# Patient Record
Sex: MALE | Race: WHITE | Employment: FULL TIME | URBAN - METROPOLITAN AREA
[De-identification: names, ages, dates, MRNs, and addresses within clinical notes are randomized per-mention and may not be internally consistent; named-entity substitution may affect disease eponyms.]

---

## 2017-02-02 ENCOUNTER — OFFICE VISIT (OUTPATIENT)
Dept: ORTHOPEDIC SURGERY | Age: 52
End: 2017-02-02

## 2017-02-02 VITALS
SYSTOLIC BLOOD PRESSURE: 137 MMHG | HEIGHT: 75 IN | DIASTOLIC BLOOD PRESSURE: 92 MMHG | HEART RATE: 72 BPM | BODY MASS INDEX: 31.82 KG/M2 | WEIGHT: 255.95 LBS

## 2017-02-02 DIAGNOSIS — M25.561 ACUTE PAIN OF RIGHT KNEE: Primary | ICD-10-CM

## 2017-02-02 PROCEDURE — 73562 X-RAY EXAM OF KNEE 3: CPT | Performed by: ORTHOPAEDIC SURGERY

## 2017-02-02 PROCEDURE — 20610 DRAIN/INJ JOINT/BURSA W/O US: CPT | Performed by: ORTHOPAEDIC SURGERY

## 2017-02-02 PROCEDURE — 99213 OFFICE O/P EST LOW 20 MIN: CPT | Performed by: ORTHOPAEDIC SURGERY

## 2017-02-27 RX ORDER — MELOXICAM 15 MG/1
TABLET ORAL
Qty: 90 TABLET | Refills: 0 | Status: SHIPPED | OUTPATIENT
Start: 2017-02-27 | End: 2017-05-17 | Stop reason: SDUPTHER

## 2017-05-17 ENCOUNTER — OFFICE VISIT (OUTPATIENT)
Dept: FAMILY MEDICINE CLINIC | Age: 52
End: 2017-05-17

## 2017-05-17 VITALS
HEART RATE: 70 BPM | BODY MASS INDEX: 30.71 KG/M2 | DIASTOLIC BLOOD PRESSURE: 82 MMHG | OXYGEN SATURATION: 98 % | SYSTOLIC BLOOD PRESSURE: 120 MMHG | TEMPERATURE: 98.8 F | WEIGHT: 247 LBS

## 2017-05-17 DIAGNOSIS — K21.9 GASTROESOPHAGEAL REFLUX DISEASE WITHOUT ESOPHAGITIS: ICD-10-CM

## 2017-05-17 DIAGNOSIS — M75.41 SHOULDER IMPINGEMENT, RIGHT: ICD-10-CM

## 2017-05-17 DIAGNOSIS — M19.90 ARTHRITIS: Primary | ICD-10-CM

## 2017-05-17 PROCEDURE — 99213 OFFICE O/P EST LOW 20 MIN: CPT | Performed by: NURSE PRACTITIONER

## 2017-05-17 RX ORDER — HYDROCODONE BITARTRATE AND ACETAMINOPHEN 5; 325 MG/1; MG/1
1-2 TABLET ORAL EVERY 6 HOURS PRN
Qty: 120 TABLET | Refills: 0 | Status: SHIPPED | OUTPATIENT
Start: 2017-05-17 | End: 2017-12-22 | Stop reason: SDUPTHER

## 2017-05-17 RX ORDER — MELOXICAM 15 MG/1
TABLET ORAL
Qty: 90 TABLET | Refills: 0 | Status: SHIPPED | OUTPATIENT
Start: 2017-05-17 | End: 2017-09-07 | Stop reason: SDUPTHER

## 2017-05-17 RX ORDER — OMEPRAZOLE 20 MG/1
CAPSULE, DELAYED RELEASE ORAL
Qty: 180 CAPSULE | Refills: 3 | Status: SHIPPED | OUTPATIENT
Start: 2017-05-17 | End: 2017-12-22 | Stop reason: SDUPTHER

## 2017-06-21 ENCOUNTER — OFFICE VISIT (OUTPATIENT)
Dept: FAMILY MEDICINE CLINIC | Age: 52
End: 2017-06-21

## 2017-06-21 VITALS
OXYGEN SATURATION: 98 % | WEIGHT: 249 LBS | HEART RATE: 73 BPM | HEIGHT: 75 IN | DIASTOLIC BLOOD PRESSURE: 86 MMHG | BODY MASS INDEX: 30.96 KG/M2 | TEMPERATURE: 98.8 F | SYSTOLIC BLOOD PRESSURE: 132 MMHG

## 2017-06-21 DIAGNOSIS — Z01.818 PREOP EXAMINATION: Primary | ICD-10-CM

## 2017-06-21 PROCEDURE — 99242 OFF/OP CONSLTJ NEW/EST SF 20: CPT | Performed by: NURSE PRACTITIONER

## 2017-06-26 ENCOUNTER — TELEPHONE (OUTPATIENT)
Dept: FAMILY MEDICINE CLINIC | Age: 52
End: 2017-06-26

## 2017-09-07 DIAGNOSIS — M19.90 ARTHRITIS: ICD-10-CM

## 2017-09-08 RX ORDER — MELOXICAM 15 MG/1
TABLET ORAL
Qty: 90 TABLET | Refills: 0 | Status: SHIPPED | OUTPATIENT
Start: 2017-09-08 | End: 2017-11-27 | Stop reason: SDUPTHER

## 2017-11-27 DIAGNOSIS — M19.90 ARTHRITIS: ICD-10-CM

## 2017-11-28 RX ORDER — MELOXICAM 15 MG/1
TABLET ORAL
Qty: 90 TABLET | Refills: 0 | Status: SHIPPED | OUTPATIENT
Start: 2017-11-28 | End: 2017-12-22 | Stop reason: SDUPTHER

## 2017-12-22 ENCOUNTER — OFFICE VISIT (OUTPATIENT)
Dept: FAMILY MEDICINE CLINIC | Age: 52
End: 2017-12-22

## 2017-12-22 VITALS
OXYGEN SATURATION: 98 % | HEART RATE: 73 BPM | BODY MASS INDEX: 32.05 KG/M2 | SYSTOLIC BLOOD PRESSURE: 130 MMHG | WEIGHT: 256.4 LBS | TEMPERATURE: 98.3 F | DIASTOLIC BLOOD PRESSURE: 100 MMHG

## 2017-12-22 DIAGNOSIS — M54.50 CHRONIC RIGHT-SIDED LOW BACK PAIN WITHOUT SCIATICA: Primary | ICD-10-CM

## 2017-12-22 DIAGNOSIS — M19.90 ARTHRITIS: ICD-10-CM

## 2017-12-22 DIAGNOSIS — I10 ESSENTIAL HYPERTENSION: ICD-10-CM

## 2017-12-22 DIAGNOSIS — G89.29 CHRONIC RIGHT-SIDED LOW BACK PAIN WITHOUT SCIATICA: Primary | ICD-10-CM

## 2017-12-22 DIAGNOSIS — K21.9 GASTROESOPHAGEAL REFLUX DISEASE WITHOUT ESOPHAGITIS: ICD-10-CM

## 2017-12-22 PROCEDURE — 1036F TOBACCO NON-USER: CPT | Performed by: NURSE PRACTITIONER

## 2017-12-22 PROCEDURE — 3017F COLORECTAL CA SCREEN DOC REV: CPT | Performed by: NURSE PRACTITIONER

## 2017-12-22 PROCEDURE — G8484 FLU IMMUNIZE NO ADMIN: HCPCS | Performed by: NURSE PRACTITIONER

## 2017-12-22 PROCEDURE — G8417 CALC BMI ABV UP PARAM F/U: HCPCS | Performed by: NURSE PRACTITIONER

## 2017-12-22 PROCEDURE — G8427 DOCREV CUR MEDS BY ELIG CLIN: HCPCS | Performed by: NURSE PRACTITIONER

## 2017-12-22 PROCEDURE — 99214 OFFICE O/P EST MOD 30 MIN: CPT | Performed by: NURSE PRACTITIONER

## 2017-12-22 RX ORDER — OMEPRAZOLE 20 MG/1
CAPSULE, DELAYED RELEASE ORAL
Qty: 180 CAPSULE | Refills: 3 | Status: SHIPPED | OUTPATIENT
Start: 2017-12-22 | End: 2018-12-29 | Stop reason: SDUPTHER

## 2017-12-22 RX ORDER — MELOXICAM 15 MG/1
TABLET ORAL
Qty: 90 TABLET | Refills: 1 | Status: SHIPPED | OUTPATIENT
Start: 2017-12-22 | End: 2018-08-05 | Stop reason: SDUPTHER

## 2017-12-22 RX ORDER — LISINOPRIL 10 MG/1
10 TABLET ORAL DAILY
Qty: 30 TABLET | Refills: 3 | Status: SHIPPED | OUTPATIENT
Start: 2017-12-22 | End: 2018-01-19 | Stop reason: SDUPTHER

## 2017-12-22 RX ORDER — HYDROCODONE BITARTRATE AND ACETAMINOPHEN 5; 325 MG/1; MG/1
1-2 TABLET ORAL EVERY 6 HOURS PRN
Qty: 120 TABLET | Refills: 0 | Status: SHIPPED | OUTPATIENT
Start: 2017-12-22 | End: 2019-01-18 | Stop reason: SDUPTHER

## 2017-12-22 RX ORDER — PREDNISONE 10 MG/1
TABLET ORAL
Qty: 18 TABLET | Refills: 0 | Status: SHIPPED | OUTPATIENT
Start: 2017-12-22 | End: 2018-01-19 | Stop reason: ALTCHOICE

## 2017-12-22 NOTE — PROGRESS NOTES
Patient: Flor Barron is a 46 y.o. male who presents today with the following Chief Complaint(s):  Chief Complaint   Patient presents with    Medication Check         HPI   Patient is a 45 yo male who is here for med check for chronic back pain and arthritis. He states his back is starting to hurt again- feels like a pinched nerve. Takes norco sparingly. Takes mobic for arthritis. Has taken muscle relaxers in the past for his back but makes him too groggy. Patient states the pain is the worst with standing. Has pain after about 10 minutes of standing. BP elevated today- denies chest pain but states he has had some chest pressure recently. No headaches or SOB. Current Outpatient Prescriptions   Medication Sig Dispense Refill    omeprazole (PRILOSEC) 20 MG delayed release capsule TAKE ONE CAPSULE BY MOUTH TWICE A  capsule 3    HYDROcodone-acetaminophen (NORCO) 5-325 MG per tablet Take 1-2 tablets by mouth every 6 hours as needed for Pain . Earliest Fill Date: 12/22/17 120 tablet 0    meloxicam (MOBIC) 15 MG tablet TAKE 1 TABLET BY MOUTH  DAILY 90 tablet 1    predniSONE (DELTASONE) 10 MG tablet Take 3 tabs for days 1-3, take 2 tabs day 4-6, take 1 tab days 7-9. 18 tablet 0    lisinopril (PRINIVIL;ZESTRIL) 10 MG tablet Take 1 tablet by mouth daily 30 tablet 3    ascorbic acid (VITAMIN C) 500 MG tablet Take 500 mg by mouth daily.  Potassium Gluconate 550 MG TABS Take 550 mg by mouth.  Ginkgo Biloba 40 MG TABS Take 120 mg by mouth.  ondansetron (ZOFRAN ODT) 4 MG disintegrating tablet Take 1-2 tablets by mouth every 12 hours as needed for Nausea 12 tablet 0    methocarbamol (ROBAXIN) 500 MG tablet Take 1 tablet by mouth 4 times daily 10 tablet 0    naproxen (NAPROSYN) 500 MG tablet Take 1 tablet by mouth 2 times daily 30 tablet 0     No current facility-administered medications for this visit.         Patient's past medical history, surgical history, family history, medications,  and

## 2017-12-22 NOTE — LETTER
[address] [address]  Phone: [phone]  Fax: [phone]    Jeanette Rosario, APRN        December 22, 2017     Patient: Puja Calero   YOB: 1965   Date of Visit: 12/22/2017       To Whom It May Concern: It is my medical opinion that JAYDE Hammonds requires a disability parking placard for the following reasons:  He cannot walk 200 feet without stopping to rest.  Duration of need: 2 years    If you have any questions or concerns, please don't hesitate to call.     Sincerely,        Jeanette Rosario, APRN

## 2018-01-19 ENCOUNTER — OFFICE VISIT (OUTPATIENT)
Dept: FAMILY MEDICINE CLINIC | Age: 53
End: 2018-01-19

## 2018-01-19 VITALS
BODY MASS INDEX: 33.27 KG/M2 | HEIGHT: 73 IN | RESPIRATION RATE: 18 BRPM | OXYGEN SATURATION: 98 % | SYSTOLIC BLOOD PRESSURE: 120 MMHG | HEART RATE: 65 BPM | WEIGHT: 251 LBS | DIASTOLIC BLOOD PRESSURE: 88 MMHG

## 2018-01-19 DIAGNOSIS — I10 ESSENTIAL HYPERTENSION: ICD-10-CM

## 2018-01-19 DIAGNOSIS — Z00.00 PHYSICAL EXAM: Primary | ICD-10-CM

## 2018-01-19 LAB
A/G RATIO: 1.5 (ref 1.1–2.2)
ALBUMIN SERPL-MCNC: 4.3 G/DL (ref 3.4–5)
ALP BLD-CCNC: 79 U/L (ref 40–129)
ALT SERPL-CCNC: 29 U/L (ref 10–40)
ANION GAP SERPL CALCULATED.3IONS-SCNC: 13 MMOL/L (ref 3–16)
AST SERPL-CCNC: 20 U/L (ref 15–37)
BASOPHILS ABSOLUTE: 0 K/UL (ref 0–0.2)
BASOPHILS RELATIVE PERCENT: 0.6 %
BILIRUB SERPL-MCNC: 0.6 MG/DL (ref 0–1)
BUN BLDV-MCNC: 15 MG/DL (ref 7–20)
CALCIUM SERPL-MCNC: 9.5 MG/DL (ref 8.3–10.6)
CHLORIDE BLD-SCNC: 106 MMOL/L (ref 99–110)
CHOLESTEROL, TOTAL: 154 MG/DL (ref 0–199)
CO2: 24 MMOL/L (ref 21–32)
CREAT SERPL-MCNC: 0.8 MG/DL (ref 0.9–1.3)
EOSINOPHILS ABSOLUTE: 0.1 K/UL (ref 0–0.6)
EOSINOPHILS RELATIVE PERCENT: 1.8 %
GFR AFRICAN AMERICAN: >60
GFR NON-AFRICAN AMERICAN: >60
GLOBULIN: 2.9 G/DL
GLUCOSE BLD-MCNC: 108 MG/DL (ref 70–99)
HCT VFR BLD CALC: 44.6 % (ref 40.5–52.5)
HDLC SERPL-MCNC: 35 MG/DL (ref 40–60)
HEMOGLOBIN: 14.5 G/DL (ref 13.5–17.5)
LDL CHOLESTEROL CALCULATED: 102 MG/DL
LYMPHOCYTES ABSOLUTE: 1.8 K/UL (ref 1–5.1)
LYMPHOCYTES RELATIVE PERCENT: 27.5 %
MCH RBC QN AUTO: 29.2 PG (ref 26–34)
MCHC RBC AUTO-ENTMCNC: 32.6 G/DL (ref 31–36)
MCV RBC AUTO: 89.7 FL (ref 80–100)
MONOCYTES ABSOLUTE: 0.7 K/UL (ref 0–1.3)
MONOCYTES RELATIVE PERCENT: 10.8 %
NEUTROPHILS ABSOLUTE: 3.8 K/UL (ref 1.7–7.7)
NEUTROPHILS RELATIVE PERCENT: 59.3 %
PDW BLD-RTO: 14.9 % (ref 12.4–15.4)
PLATELET # BLD: 240 K/UL (ref 135–450)
PMV BLD AUTO: 9.9 FL (ref 5–10.5)
POTASSIUM SERPL-SCNC: 4.5 MMOL/L (ref 3.5–5.1)
RBC # BLD: 4.98 M/UL (ref 4.2–5.9)
SODIUM BLD-SCNC: 143 MMOL/L (ref 136–145)
TOTAL PROTEIN: 7.2 G/DL (ref 6.4–8.2)
TRIGL SERPL-MCNC: 84 MG/DL (ref 0–150)
VLDLC SERPL CALC-MCNC: 17 MG/DL
WBC # BLD: 6.4 K/UL (ref 4–11)

## 2018-01-19 PROCEDURE — 99396 PREV VISIT EST AGE 40-64: CPT | Performed by: NURSE PRACTITIONER

## 2018-01-19 PROCEDURE — 36415 COLL VENOUS BLD VENIPUNCTURE: CPT | Performed by: NURSE PRACTITIONER

## 2018-01-19 RX ORDER — LISINOPRIL 10 MG/1
10 TABLET ORAL DAILY
Qty: 90 TABLET | Refills: 1 | Status: SHIPPED | OUTPATIENT
Start: 2018-01-19 | End: 2018-06-16 | Stop reason: SDUPTHER

## 2018-01-19 ASSESSMENT — PATIENT HEALTH QUESTIONNAIRE - PHQ9
SUM OF ALL RESPONSES TO PHQ QUESTIONS 1-9: 0
2. FEELING DOWN, DEPRESSED OR HOPELESS: 0
1. LITTLE INTEREST OR PLEASURE IN DOING THINGS: 0
SUM OF ALL RESPONSES TO PHQ9 QUESTIONS 1 & 2: 0

## 2018-01-19 ASSESSMENT — ENCOUNTER SYMPTOMS
CONSTIPATION: 0
SORE THROAT: 0
BACK PAIN: 1
SHORTNESS OF BREATH: 0
COUGH: 0
ABDOMINAL PAIN: 0
BLURRED VISION: 0
HEARTBURN: 1
DIARRHEA: 0

## 2018-01-20 LAB — HEPATITIS C ANTIBODY INTERPRETATION: NORMAL

## 2018-01-22 LAB
HIV AG/AB: NORMAL
HIV ANTIGEN: NORMAL
HIV-1 ANTIBODY: NORMAL
HIV-2 AB: NORMAL

## 2018-06-16 DIAGNOSIS — I10 ESSENTIAL HYPERTENSION: ICD-10-CM

## 2018-06-18 RX ORDER — LISINOPRIL 10 MG/1
10 TABLET ORAL DAILY
Qty: 90 TABLET | Refills: 1 | Status: SHIPPED | OUTPATIENT
Start: 2018-06-18 | End: 2018-11-30 | Stop reason: SDUPTHER

## 2018-07-16 ENCOUNTER — OFFICE VISIT (OUTPATIENT)
Dept: FAMILY MEDICINE CLINIC | Age: 53
End: 2018-07-16

## 2018-07-16 VITALS
BODY MASS INDEX: 32.74 KG/M2 | OXYGEN SATURATION: 97 % | RESPIRATION RATE: 16 BRPM | WEIGHT: 247 LBS | DIASTOLIC BLOOD PRESSURE: 84 MMHG | SYSTOLIC BLOOD PRESSURE: 118 MMHG | HEIGHT: 73 IN | HEART RATE: 76 BPM

## 2018-07-16 DIAGNOSIS — I10 ESSENTIAL HYPERTENSION: Primary | ICD-10-CM

## 2018-07-16 PROCEDURE — G8417 CALC BMI ABV UP PARAM F/U: HCPCS | Performed by: NURSE PRACTITIONER

## 2018-07-16 PROCEDURE — 3017F COLORECTAL CA SCREEN DOC REV: CPT | Performed by: NURSE PRACTITIONER

## 2018-07-16 PROCEDURE — 99213 OFFICE O/P EST LOW 20 MIN: CPT | Performed by: NURSE PRACTITIONER

## 2018-07-16 PROCEDURE — 1036F TOBACCO NON-USER: CPT | Performed by: NURSE PRACTITIONER

## 2018-07-16 PROCEDURE — G8427 DOCREV CUR MEDS BY ELIG CLIN: HCPCS | Performed by: NURSE PRACTITIONER

## 2018-07-16 ASSESSMENT — ENCOUNTER SYMPTOMS: SHORTNESS OF BREATH: 0

## 2018-07-16 NOTE — PROGRESS NOTES
Patient: Uriel Mccormick is a 46 y.o. male who presents today with the following Chief Complaint(s):  Chief Complaint   Patient presents with    6 Month Follow-Up     HTN         HPI   Aspen Hagen is a 45 yo male who is here for 6 month follow up on HTN. States he has been feeling well. No chest pain, SOB, headaches or dizziness. BP at home have been in the teens/80's- occasionally 130's/80s. TD[facility] about a year ago at [facility] 10/15/17    Current Outpatient Prescriptions   Medication Sig Dispense Refill    lisinopril (PRINIVIL;ZESTRIL) 10 MG tablet TAKE 1 TABLET BY MOUTH  DAILY 90 tablet 1    omeprazole (PRILOSEC) 20 MG delayed release capsule TAKE ONE CAPSULE BY MOUTH TWICE A  capsule 3    HYDROcodone-acetaminophen (NORCO) 5-325 MG per tablet Take 1-2 tablets by mouth every 6 hours as needed for Pain . Earliest Fill Date: 12/22/17 120 tablet 0    meloxicam (MOBIC) 15 MG tablet TAKE 1 TABLET BY MOUTH  DAILY 90 tablet 1    ascorbic acid (VITAMIN C) 500 MG tablet Take 500 mg by mouth daily.  Potassium Gluconate 550 MG TABS Take 550 mg by mouth.  Ginkgo Biloba 40 MG TABS Take 120 mg by mouth. No current facility-administered medications for this visit. Patient's past medical history, surgical history, family history, medications,  and allergies  were all reviewed and updated as appropriate today. Review of Systems   Constitutional: Negative for chills and fever. Respiratory: Negative for shortness of breath. Cardiovascular: Negative for chest pain, palpitations and leg swelling. Neurological: Negative for dizziness and headaches. Physical Exam   Constitutional: He is oriented to person, place, and time. Vital signs are normal. He appears well-developed and well-nourished. HENT:   Head: Normocephalic and atraumatic. Eyes: Conjunctivae are normal.   Neck: Normal range of motion. Cardiovascular: Normal rate, regular rhythm and normal heart sounds.     No murmur heard.  Pulmonary/Chest: Effort normal and breath sounds normal. No respiratory distress. He has no wheezes. He has no rales. Musculoskeletal: Normal range of motion. Neurological: He is alert and oriented to person, place, and time. Skin: Skin is warm and dry. Psychiatric: He has a normal mood and affect. His behavior is normal. Judgment and thought content normal.   Nursing note and vitals reviewed. Vitals:    07/16/18 0850   BP: 118/84   Pulse: 76   Resp: 16   SpO2: 97%       Assessment:  Encounter Diagnosis   Name Primary?  Essential hypertension Yes       Plan:  1. Essential hypertension  Continue current meds    Return in about 6 months (around 1/16/2019) for physical with fasting labs.

## 2018-11-30 DIAGNOSIS — I10 ESSENTIAL HYPERTENSION: ICD-10-CM

## 2018-12-03 RX ORDER — LISINOPRIL 10 MG/1
10 TABLET ORAL DAILY
Qty: 90 TABLET | Refills: 0 | Status: SHIPPED | OUTPATIENT
Start: 2018-12-03 | End: 2019-02-15 | Stop reason: SDUPTHER

## 2019-01-18 ENCOUNTER — OFFICE VISIT (OUTPATIENT)
Dept: FAMILY MEDICINE CLINIC | Age: 54
End: 2019-01-18
Payer: COMMERCIAL

## 2019-01-18 VITALS
HEART RATE: 65 BPM | WEIGHT: 248 LBS | SYSTOLIC BLOOD PRESSURE: 126 MMHG | HEIGHT: 74 IN | BODY MASS INDEX: 31.83 KG/M2 | DIASTOLIC BLOOD PRESSURE: 78 MMHG | OXYGEN SATURATION: 98 %

## 2019-01-18 DIAGNOSIS — G89.29 CHRONIC RIGHT-SIDED LOW BACK PAIN WITHOUT SCIATICA: ICD-10-CM

## 2019-01-18 DIAGNOSIS — M54.50 CHRONIC RIGHT-SIDED LOW BACK PAIN WITHOUT SCIATICA: ICD-10-CM

## 2019-01-18 DIAGNOSIS — N52.9 ERECTILE DYSFUNCTION, UNSPECIFIED ERECTILE DYSFUNCTION TYPE: ICD-10-CM

## 2019-01-18 DIAGNOSIS — Z00.00 HEALTHY ADULT ON ROUTINE PHYSICAL EXAMINATION: Primary | ICD-10-CM

## 2019-01-18 LAB
A/G RATIO: 1.7 (ref 1.1–2.2)
ALBUMIN SERPL-MCNC: 4.3 G/DL (ref 3.4–5)
ALP BLD-CCNC: 75 U/L (ref 40–129)
ALT SERPL-CCNC: 31 U/L (ref 10–40)
ANION GAP SERPL CALCULATED.3IONS-SCNC: 14 MMOL/L (ref 3–16)
AST SERPL-CCNC: 24 U/L (ref 15–37)
BILIRUB SERPL-MCNC: 0.6 MG/DL (ref 0–1)
BUN BLDV-MCNC: 12 MG/DL (ref 7–20)
CALCIUM SERPL-MCNC: 9.3 MG/DL (ref 8.3–10.6)
CHLORIDE BLD-SCNC: 104 MMOL/L (ref 99–110)
CHOLESTEROL, TOTAL: 133 MG/DL (ref 0–199)
CO2: 23 MMOL/L (ref 21–32)
CREAT SERPL-MCNC: 0.9 MG/DL (ref 0.9–1.3)
GFR AFRICAN AMERICAN: >60
GFR NON-AFRICAN AMERICAN: >60
GLOBULIN: 2.5 G/DL
GLUCOSE BLD-MCNC: 112 MG/DL (ref 70–99)
HDLC SERPL-MCNC: 30 MG/DL (ref 40–60)
LDL CHOLESTEROL CALCULATED: 86 MG/DL
POTASSIUM SERPL-SCNC: 4.4 MMOL/L (ref 3.5–5.1)
PROSTATE SPECIFIC ANTIGEN: 0.39 NG/ML (ref 0–4)
SODIUM BLD-SCNC: 141 MMOL/L (ref 136–145)
TOTAL PROTEIN: 6.8 G/DL (ref 6.4–8.2)
TRIGL SERPL-MCNC: 84 MG/DL (ref 0–150)
VLDLC SERPL CALC-MCNC: 17 MG/DL

## 2019-01-18 PROCEDURE — G8484 FLU IMMUNIZE NO ADMIN: HCPCS | Performed by: FAMILY MEDICINE

## 2019-01-18 PROCEDURE — 36415 COLL VENOUS BLD VENIPUNCTURE: CPT | Performed by: FAMILY MEDICINE

## 2019-01-18 PROCEDURE — 99396 PREV VISIT EST AGE 40-64: CPT | Performed by: FAMILY MEDICINE

## 2019-01-18 RX ORDER — HYDROCODONE BITARTRATE AND ACETAMINOPHEN 5; 325 MG/1; MG/1
1 TABLET ORAL EVERY 6 HOURS PRN
Qty: 120 TABLET | Refills: 0 | Status: SHIPPED | OUTPATIENT
Start: 2019-01-18 | End: 2019-02-07 | Stop reason: SDUPTHER

## 2019-01-18 RX ORDER — SILDENAFIL CITRATE 20 MG/1
60 TABLET ORAL DAILY PRN
Qty: 30 TABLET | Refills: 0 | Status: SHIPPED | OUTPATIENT
Start: 2019-01-18 | End: 2020-01-28

## 2019-01-18 ASSESSMENT — ENCOUNTER SYMPTOMS
CHEST TIGHTNESS: 0
SHORTNESS OF BREATH: 0
EYE PAIN: 0
COLOR CHANGE: 0
BACK PAIN: 0
DIARRHEA: 0
TROUBLE SWALLOWING: 0
CONSTIPATION: 0
RHINORRHEA: 0

## 2019-01-18 ASSESSMENT — PATIENT HEALTH QUESTIONNAIRE - PHQ9
SUM OF ALL RESPONSES TO PHQ9 QUESTIONS 1 & 2: 0
SUM OF ALL RESPONSES TO PHQ QUESTIONS 1-9: 0
1. LITTLE INTEREST OR PLEASURE IN DOING THINGS: 0
SUM OF ALL RESPONSES TO PHQ QUESTIONS 1-9: 0
2. FEELING DOWN, DEPRESSED OR HOPELESS: 0

## 2019-01-19 LAB
ESTIMATED AVERAGE GLUCOSE: 119.8 MG/DL
HBA1C MFR BLD: 5.8 %

## 2019-01-21 ENCOUNTER — TELEPHONE (OUTPATIENT)
Dept: FAMILY MEDICINE CLINIC | Age: 54
End: 2019-01-21

## 2019-02-07 ENCOUNTER — TELEPHONE (OUTPATIENT)
Dept: FAMILY MEDICINE CLINIC | Age: 54
End: 2019-02-07

## 2019-02-07 DIAGNOSIS — G89.29 CHRONIC RIGHT-SIDED LOW BACK PAIN WITHOUT SCIATICA: ICD-10-CM

## 2019-02-07 DIAGNOSIS — M54.50 CHRONIC RIGHT-SIDED LOW BACK PAIN WITHOUT SCIATICA: ICD-10-CM

## 2019-02-07 RX ORDER — HYDROCODONE BITARTRATE AND ACETAMINOPHEN 5; 325 MG/1; MG/1
1 TABLET ORAL EVERY 6 HOURS PRN
Qty: 120 TABLET | Refills: 0 | Status: SHIPPED | OUTPATIENT
Start: 2019-02-07 | End: 2019-03-09

## 2019-02-08 DIAGNOSIS — G89.29 CHRONIC RIGHT-SIDED LOW BACK PAIN WITHOUT SCIATICA: ICD-10-CM

## 2019-02-08 DIAGNOSIS — M54.50 CHRONIC RIGHT-SIDED LOW BACK PAIN WITHOUT SCIATICA: ICD-10-CM

## 2019-02-08 NOTE — TELEPHONE ENCOUNTER
From: Wilmer Friday  Sent: 2/7/2019 9:16 PM EST  Subject: Medication Renewal Request    Parker Walker would like a refill of the following medications:     HYDROcodone-acetaminophen (NORCO) 5-325 MG per tablet Lyssa Rizzo MD]   Patient Comment: [facility] could only give me a 7 day supply and now they are requesting another prescription. If posible can we just use the [facility] mail order for these in smaller Qty every 3 months?     Preferred pharmacy: [address] [place] [address]

## 2019-02-11 RX ORDER — HYDROCODONE BITARTRATE AND ACETAMINOPHEN 5; 325 MG/1; MG/1
1 TABLET ORAL EVERY 6 HOURS PRN
Qty: 120 TABLET | Refills: 0 | OUTPATIENT
Start: 2019-02-11 | End: 2019-03-13

## 2019-02-15 DIAGNOSIS — I10 ESSENTIAL HYPERTENSION: ICD-10-CM

## 2019-02-15 RX ORDER — LISINOPRIL 10 MG/1
10 TABLET ORAL DAILY
Qty: 90 TABLET | Refills: 0 | Status: SHIPPED | OUTPATIENT
Start: 2019-02-15 | End: 2019-04-29 | Stop reason: SDUPTHER

## 2019-04-06 DIAGNOSIS — M19.90 ARTHRITIS: ICD-10-CM

## 2019-04-08 RX ORDER — MELOXICAM 15 MG/1
TABLET ORAL
Qty: 90 TABLET | Refills: 2 | Status: SHIPPED | OUTPATIENT
Start: 2019-04-08 | End: 2019-11-19 | Stop reason: SDUPTHER

## 2019-04-29 DIAGNOSIS — I10 ESSENTIAL HYPERTENSION: ICD-10-CM

## 2019-04-30 RX ORDER — LISINOPRIL 10 MG/1
10 TABLET ORAL DAILY
Qty: 90 TABLET | Refills: 0 | Status: SHIPPED | OUTPATIENT
Start: 2019-04-30 | End: 2019-07-10 | Stop reason: SDUPTHER

## 2019-06-05 DIAGNOSIS — K21.9 GASTROESOPHAGEAL REFLUX DISEASE WITHOUT ESOPHAGITIS: ICD-10-CM

## 2019-06-06 RX ORDER — OMEPRAZOLE 20 MG/1
CAPSULE, DELAYED RELEASE ORAL
Qty: 180 CAPSULE | Refills: 1 | Status: SHIPPED | OUTPATIENT
Start: 2019-06-06 | End: 2019-10-24 | Stop reason: SDUPTHER

## 2019-10-24 DIAGNOSIS — K21.9 GASTROESOPHAGEAL REFLUX DISEASE WITHOUT ESOPHAGITIS: ICD-10-CM

## 2019-10-25 RX ORDER — OMEPRAZOLE 20 MG/1
CAPSULE, DELAYED RELEASE ORAL
Qty: 180 CAPSULE | Refills: 1 | Status: SHIPPED | OUTPATIENT
Start: 2019-10-25 | End: 2021-08-16

## 2019-11-13 ENCOUNTER — OFFICE VISIT (OUTPATIENT)
Dept: FAMILY MEDICINE CLINIC | Age: 54
End: 2019-11-13
Payer: COMMERCIAL

## 2019-11-13 VITALS
HEART RATE: 67 BPM | SYSTOLIC BLOOD PRESSURE: 132 MMHG | WEIGHT: 249 LBS | DIASTOLIC BLOOD PRESSURE: 74 MMHG | OXYGEN SATURATION: 99 % | BODY MASS INDEX: 32.41 KG/M2

## 2019-11-13 DIAGNOSIS — G89.29 CHRONIC LOW BACK PAIN, UNSPECIFIED BACK PAIN LATERALITY, UNSPECIFIED WHETHER SCIATICA PRESENT: Primary | ICD-10-CM

## 2019-11-13 DIAGNOSIS — I10 ESSENTIAL HYPERTENSION: ICD-10-CM

## 2019-11-13 DIAGNOSIS — M54.50 CHRONIC LOW BACK PAIN, UNSPECIFIED BACK PAIN LATERALITY, UNSPECIFIED WHETHER SCIATICA PRESENT: Primary | ICD-10-CM

## 2019-11-13 PROCEDURE — G8484 FLU IMMUNIZE NO ADMIN: HCPCS | Performed by: FAMILY MEDICINE

## 2019-11-13 PROCEDURE — 99214 OFFICE O/P EST MOD 30 MIN: CPT | Performed by: FAMILY MEDICINE

## 2019-11-13 PROCEDURE — 1036F TOBACCO NON-USER: CPT | Performed by: FAMILY MEDICINE

## 2019-11-13 PROCEDURE — 3017F COLORECTAL CA SCREEN DOC REV: CPT | Performed by: FAMILY MEDICINE

## 2019-11-13 PROCEDURE — G8417 CALC BMI ABV UP PARAM F/U: HCPCS | Performed by: FAMILY MEDICINE

## 2019-11-13 PROCEDURE — G8427 DOCREV CUR MEDS BY ELIG CLIN: HCPCS | Performed by: FAMILY MEDICINE

## 2019-11-13 RX ORDER — METHOCARBAMOL 750 MG/1
750 TABLET, FILM COATED ORAL 4 TIMES DAILY
Qty: 120 TABLET | Refills: 0 | Status: SHIPPED | OUTPATIENT
Start: 2019-11-13 | End: 2021-05-31 | Stop reason: SDUPTHER

## 2019-11-19 DIAGNOSIS — M19.90 ARTHRITIS: ICD-10-CM

## 2019-11-20 RX ORDER — MELOXICAM 15 MG/1
TABLET ORAL
Qty: 90 TABLET | Refills: 2 | Status: SHIPPED | OUTPATIENT
Start: 2019-11-20 | End: 2020-07-19 | Stop reason: SDUPTHER

## 2019-11-24 ASSESSMENT — ENCOUNTER SYMPTOMS
BACK PAIN: 1
BLURRED VISION: 0

## 2020-01-28 ENCOUNTER — HOSPITAL ENCOUNTER (EMERGENCY)
Age: 55
Discharge: HOME | End: 2020-01-28
Payer: COMMERCIAL

## 2020-01-28 VITALS
BODY MASS INDEX: 30.46 KG/M2 | SYSTOLIC BLOOD PRESSURE: 114 MMHG | WEIGHT: 245 LBS | DIASTOLIC BLOOD PRESSURE: 79 MMHG | HEIGHT: 75 IN | TEMPERATURE: 98.3 F | HEART RATE: 88 BPM | RESPIRATION RATE: 16 BRPM | OXYGEN SATURATION: 97 %

## 2020-01-28 PROCEDURE — 99283 EMERGENCY DEPT VISIT LOW MDM: CPT

## 2020-01-28 PROCEDURE — 6370000000 HC RX 637 (ALT 250 FOR IP): Performed by: NURSE PRACTITIONER

## 2020-01-28 RX ORDER — FLUTICASONE PROPIONATE 50 MCG
1 SPRAY, SUSPENSION (ML) NASAL DAILY
COMMUNITY
End: 2020-09-15

## 2020-01-28 RX ORDER — LORATADINE 10 MG/1
10 CAPSULE, LIQUID FILLED ORAL DAILY
Status: ON HOLD | COMMUNITY
End: 2020-09-15

## 2020-01-28 RX ORDER — PREDNISONE 20 MG/1
60 TABLET ORAL ONCE
Status: COMPLETED | OUTPATIENT
Start: 2020-01-28 | End: 2020-01-28

## 2020-01-28 RX ORDER — PREDNISONE 10 MG/1
40 TABLET ORAL DAILY
Qty: 16 TABLET | Refills: 0 | Status: SHIPPED | OUTPATIENT
Start: 2020-01-28 | End: 2020-02-01

## 2020-01-28 RX ADMIN — DIPHENHYDRAMINE HYDROCHLORIDE 5 ML: 12.5 SOLUTION ORAL at 03:28

## 2020-01-28 RX ADMIN — PREDNISONE 60 MG: 20 TABLET ORAL at 02:28

## 2020-01-28 ASSESSMENT — PAIN DESCRIPTION - LOCATION: LOCATION: THROAT

## 2020-01-28 ASSESSMENT — PAIN DESCRIPTION - PAIN TYPE: TYPE: ACUTE PAIN

## 2020-01-28 ASSESSMENT — PAIN SCALES - GENERAL
PAINLEVEL_OUTOF10: 2
PAINLEVEL_OUTOF10: 8

## 2020-01-28 NOTE — ED PROVIDER NOTES
Evaluated by [id number] [facility] Provider    [facility] Emergency Department    CHIEF COMPLAINT  Pharyngitis (for about 3 days states sinus issue as well. ) and Conjunctivitis (states right eye woke up witgh \"crust on it\" reddness)    HISTORY OF PRESENT ILLNESS  Lennox Reins Surface is a 47 y.o. male who presents to the ED with complaints of sore throat. Has had a lot of sinus issues and drainage. Now he feels like there is a lot of lacerations in his throat, every time he swallows he feels pain and like something is ripping in his throat. He is using cough drops but not helping, using cepacol. He thought he had the flu but went to an Urgent Care 2 days ago and was negative for strep and flu. Overall symptoms since Friday. Reports he is running fevers up to 100.8. He is not taking dayquil and nyquil, no ibuprofen. He was given flonase, steroid shot and claritin by urgent care. Had a lot of \"sleep matter\" built up in his eyelashes this morning after using an eye wash. The patient is currently rating their pain as 8/10 and describes it as an aching and tearing type of pain. Treatments tried prior to arrival in the ED include: above. The patient denies other complaints, modifying factors or associated symptoms. The patient arrived to the ED via private car. Patient is accompanied by wife who is bedside for the visit. Nursing notes reviewed.    Past Medical History:   Diagnosis Date    Chronic back pain     GERD (gastroesophageal reflux disease)     HTN (hypertension)      Past Surgical History:   Procedure Laterality Date    BACK SURGERY      x 2    CARPAL TUNNEL RELEASE      CERVICAL DISC SURGERY      COLONOSCOPY  1/28/16    KNEE SURGERY      LASIK Bilateral 2013    TONSILLECTOMY       Family History   Problem Relation Age of Onset    High Blood Pressure Mother     Diabetes Mother     Kidney Disease Mother     Heart Disease Mother     Parkinsonism Father     Dementia Father     Cancer Father     Other Brother         motocycle accident     Social History     Socioeconomic History    Marital status: Single     Spouse name: Not on file    Number of children: Not on file    Years of education: Not on file    Highest education level: Not on file   Occupational History    Not on file   Social Needs    Financial resource strain: Not on file    Food insecurity:     Worry: Not on file     Inability: Not on file    Transportation needs:     Medical: Not on file     Non-medical: Not on file   Tobacco Use    Smoking status: Former Smoker     Packs/day: 1.50     Years: 21.00     Pack years: 31.50     Types: Cigarettes     Last attempt to quit: 2006     Years since quittin.3    Smokeless tobacco: Never Used   Substance and Sexual Activity    Alcohol use: Yes     Comment: Social Drinker    Drug use: No    Sexual activity: Yes     Partners: Female   Lifestyle    Physical activity:     Days per week: Not on file     Minutes per session: Not on file    Stress: Not on file   Relationships    Social connections:     Talks on phone: Not on file     Gets together: Not on file     Attends Jewish service: Not on file     Active member of club or organization: Not on file     Attends meetings of clubs or organizations: Not on file     Relationship status: Not on file    Intimate partner violence:     Fear of current or ex partner: Not on file     Emotionally abused: Not on file     Physically abused: Not on file     Forced sexual activity: Not on file   Other Topics Concern    Not on file   Social History Narrative    Not on file     Current Facility-Administered Medications   Medication Dose Route Frequency Provider Last Rate Last Dose    predniSONE (DELTASONE) tablet 60 mg  60 mg Oral Once JALEN Vences CNP        magic (miracle) mouthwash with nystatin  5 mL Swish & Spit Once JALEN Vences CNP         Current Outpatient Medications   Medication Sig Dispense Refill    fluticasone (FLONASE) 50 MCG/ACT nasal spray 1 spray by Each Nostril route daily      loratadine (CLARITIN) 10 MG capsule Take 10 mg by mouth daily      Magic Mouthwash (MIRACLE MOUTHWASH) Swish and spit 5 mLs 4 times daily as needed for Pain Equal parts 2% lidocaine, dyphenhydramine, antacid. 240 mL 0    predniSONE (DELTASONE) 10 MG tablet Take 4 tablets by mouth daily for 4 days 16 tablet 0    meloxicam (MOBIC) 15 MG tablet TAKE 1 TABLET BY MOUTH  DAILY 90 tablet 2    methocarbamol (ROBAXIN) 750 MG tablet Take 1 tablet by mouth 4 times daily 120 tablet 0    omeprazole (PRILOSEC) 20 MG delayed release capsule TAKE 1 CAPSULE BY MOUTH  TWICE DAILY (Patient taking differently: Take 20 mg by mouth Daily ) 180 capsule 1    lisinopril (PRINIVIL;ZESTRIL) 10 MG tablet TAKE 1 TABLET BY MOUTH  DAILY 90 tablet 3    ascorbic acid (VITAMIN C) 500 MG tablet Take 500 mg by mouth daily.  Potassium Gluconate 550 MG TABS Take 550 mg by mouth 2 times daily        Allergies   Allergen Reactions    No Known Allergies          REVIEW OF SYSTEMS    6 systems reviewed, pertinent positives per HPI otherwise noted to be negative    PHYSICAL EXAM  /79   Pulse 88   Temp 98.3 °F (36.8 °C)   Resp 16   Ht 6' 3\" (1.905 m)   Wt 245 lb (111.1 kg)   SpO2 97%   BMI 30.62 kg/m²   GENERAL APPEARANCE: is well-developed, well-nourished. Awake and alert. Cooperative. No acute distress. HEAD: Normocephalic. Atraumatic. EYES: PERRL. EOM's grossly intact. Sclera is non-icteric bilaterally. Right eye with conjunctival injection. No drainage. ENT: Tympanic Membranes are normal appearing without canal edema or discharge. Nose is clear. Mucous membranes are moist.  Pharynx is erythematous, no edema noted, no petechiae, no exudate. The oropharynx is widely patent with no trismus. Tonsillar pillars are not visible. There is no evidence of peritonsillar or parapharyngeal abscess.  There is no sublingual, submental, or submandibular masses. NECK: Supple. Normal ROM. No JVD, nuchal rigidity or meningismus. There is no cervical lymphadenopathy. CARDIAC:  Regular rate and rhythm, normal S1 and S2 heart sounds. Without murmurs, gallops, or rubs. LUNGS: Breathing is unlabored. Equal and symmetric chest rise. Speaking comfortably in full sentences. Clear to auscultation bilaterally without wheezes, rales, rhonchi, retractions, accessory muscle use, or nasal flaring. Abdomen: Soft, Nondistended, nontender to palpation, normal bowel sounds. No hepatosplenomegaly, no rebound, no guarding, and no masses. No CVA tenderness. EXTREMITIES: MAEE. No acute deformities. No cyanosis, no clubbing, no edema. No petechiae or purpura. Normal color and pulses distally. SKIN: Warm and dry. NEUROLOGICAL: Alert and oriented. Strength is 5/5 bilaterally upper extremities and lower extremities. Gait observed and normal.    LABS  No results found for this visit on 01/28/20. RADIOLOGY  No results found. ED COURSE/MDM  Patient seen and evaluated. Old records reviewed. I have evaluated this patient. My supervising physician was available for consultation. Parker Walker presented to the ED today with above noted complaints. Currently patient is afebrile, not tachycardic, BP normotensive. Physical exam does reveal posterior oropharynx erythema, he does have some mild conjunctival injection to the right eye but otherwise physical exam unremarkable. Patient had flu and strep testing 2 days ago that were negative. I feel that strep is less likely a concern as his temperature per the patient's report has been improving. I feel if this was truly strep his fever would not be improving at this point without antibiotics. Given the patient's symptoms, he does have reports of right eye conjunctivitis, I do feel that this is likely viral in origin.   I do not feel that antibiotics would be beneficial and advised patient on follow-up with their primary doctor. We also discussed returning to the Emergency Department immediately if new or worsening symptoms occur. We have discussed the symptoms which are most concerning (e.g., changing or worsening pain, trouble swallowing or breating, neck stiffness, fever) that necessitate immediate return. Clinical Impression    1. Acute pharyngitis, unspecified etiology    2. Viral conjunctivitis of right eye    3. Acute upper respiratory infection        Discharge Vital Signs:  Blood pressure 114/79, pulse 88, temperature 98.3 °F (36.8 °C), resp. rate 16, height 6' 3\" (1.905 m), weight 245 lb (111.1 kg), SpO2 97 %. FOLLOW UP  Lynette Vazquez MD  [address] [place] [address]. [address] [place] [address]  [phone]    Schedule an appointment as soon as possible for a visit in 3 days  For further evaluation    [facility]  ED  [address]  Go to   If symptoms worsen      DISPOSITION  Patient was discharged to home in good condition. Comment: Please note this report has been produced using speech recognition software and may contain errors related to that system including errors in grammar, punctuation, and spelling, as well as words and phrases that may be inappropriate. If there are any questions or concerns please feel free to contact the dictating provider for clarification.                    JALEN Larsen - KEITH  01/28/20 [id number]

## 2020-03-23 ENCOUNTER — OFFICE VISIT (OUTPATIENT)
Dept: ORTHOPEDIC SURGERY | Age: 55
End: 2020-03-23
Payer: COMMERCIAL

## 2020-03-23 VITALS — WEIGHT: 244.93 LBS | HEIGHT: 75 IN | RESPIRATION RATE: 12 BRPM | BODY MASS INDEX: 30.45 KG/M2

## 2020-03-23 PROBLEM — M25.561 RIGHT KNEE PAIN: Status: ACTIVE | Noted: 2020-03-23

## 2020-03-23 PROBLEM — M67.51 PLICA SYNDROME, RIGHT KNEE: Status: ACTIVE | Noted: 2020-03-23

## 2020-03-23 PROCEDURE — 3017F COLORECTAL CA SCREEN DOC REV: CPT | Performed by: ORTHOPAEDIC SURGERY

## 2020-03-23 PROCEDURE — 1036F TOBACCO NON-USER: CPT | Performed by: ORTHOPAEDIC SURGERY

## 2020-03-23 PROCEDURE — G8417 CALC BMI ABV UP PARAM F/U: HCPCS | Performed by: ORTHOPAEDIC SURGERY

## 2020-03-23 PROCEDURE — G8427 DOCREV CUR MEDS BY ELIG CLIN: HCPCS | Performed by: ORTHOPAEDIC SURGERY

## 2020-03-23 PROCEDURE — 99203 OFFICE O/P NEW LOW 30 MIN: CPT | Performed by: ORTHOPAEDIC SURGERY

## 2020-03-23 PROCEDURE — G8484 FLU IMMUNIZE NO ADMIN: HCPCS | Performed by: ORTHOPAEDIC SURGERY

## 2020-03-23 NOTE — PROGRESS NOTES
give him some Voltaren gel to rub into the area 2-3 times a day. If his problem persists he may benefit from injection, scanning or and ultimately plica excision.

## 2020-07-20 RX ORDER — MELOXICAM 15 MG/1
15 TABLET ORAL DAILY
Qty: 90 TABLET | Refills: 1 | Status: SHIPPED | OUTPATIENT
Start: 2020-07-20 | End: 2020-07-20 | Stop reason: SDUPTHER

## 2020-07-20 RX ORDER — MELOXICAM 15 MG/1
15 TABLET ORAL DAILY
Qty: 90 TABLET | Refills: 1 | Status: SHIPPED | OUTPATIENT
Start: 2020-07-20 | End: 2021-01-04

## 2020-07-20 RX ORDER — LISINOPRIL 10 MG/1
10 TABLET ORAL DAILY
Qty: 90 TABLET | Refills: 3 | Status: SHIPPED | OUTPATIENT
Start: 2020-07-20 | End: 2021-06-14

## 2020-09-15 ENCOUNTER — HOSPITAL ENCOUNTER (OUTPATIENT)
Age: 55
Setting detail: OBSERVATION
Discharge: HOME | End: 2020-09-16
Attending: STUDENT IN AN ORGANIZED HEALTH CARE EDUCATION/TRAINING PROGRAM | Admitting: INTERNAL MEDICINE
Payer: COMMERCIAL

## 2020-09-15 ENCOUNTER — APPOINTMENT (OUTPATIENT)
Dept: GENERAL RADIOLOGY | Age: 55
End: 2020-09-15
Payer: COMMERCIAL

## 2020-09-15 LAB
A/G RATIO: 1.7 (ref 1.1–2.2)
ALBUMIN SERPL-MCNC: 4.4 G/DL (ref 3.4–5)
ALP BLD-CCNC: 81 U/L (ref 40–129)
ALT SERPL-CCNC: 25 U/L (ref 10–40)
ANION GAP SERPL CALCULATED.3IONS-SCNC: 9 MMOL/L (ref 3–16)
AST SERPL-CCNC: 19 U/L (ref 15–37)
BASOPHILS ABSOLUTE: 0.1 K/UL (ref 0–0.2)
BASOPHILS RELATIVE PERCENT: 0.8 %
BILIRUB SERPL-MCNC: 0.3 MG/DL (ref 0–1)
BUN BLDV-MCNC: 11 MG/DL (ref 7–20)
CALCIUM SERPL-MCNC: 9.6 MG/DL (ref 8.3–10.6)
CHLORIDE BLD-SCNC: 106 MMOL/L (ref 99–110)
CO2: 24 MMOL/L (ref 21–32)
CREAT SERPL-MCNC: 0.9 MG/DL (ref 0.9–1.3)
EKG ATRIAL RATE: 61 BPM
EKG DIAGNOSIS: NORMAL
EKG P AXIS: 58 DEGREES
EKG P-R INTERVAL: 160 MS
EKG Q-T INTERVAL: 390 MS
EKG QRS DURATION: 88 MS
EKG QTC CALCULATION (BAZETT): 392 MS
EKG R AXIS: 23 DEGREES
EKG T AXIS: 60 DEGREES
EKG VENTRICULAR RATE: 61 BPM
EOSINOPHILS ABSOLUTE: 0.1 K/UL (ref 0–0.6)
EOSINOPHILS RELATIVE PERCENT: 1 %
GFR AFRICAN AMERICAN: >60
GFR NON-AFRICAN AMERICAN: >60
GLOBULIN: 2.6 G/DL
GLUCOSE BLD-MCNC: 101 MG/DL (ref 70–99)
HCT VFR BLD CALC: 42.2 % (ref 40.5–52.5)
HEMOGLOBIN: 14.1 G/DL (ref 13.5–17.5)
LYMPHOCYTES ABSOLUTE: 1.9 K/UL (ref 1–5.1)
LYMPHOCYTES RELATIVE PERCENT: 24.6 %
MCH RBC QN AUTO: 30 PG (ref 26–34)
MCHC RBC AUTO-ENTMCNC: 33.5 G/DL (ref 31–36)
MCV RBC AUTO: 89.5 FL (ref 80–100)
MONOCYTES ABSOLUTE: 0.8 K/UL (ref 0–1.3)
MONOCYTES RELATIVE PERCENT: 10.7 %
NEUTROPHILS ABSOLUTE: 4.8 K/UL (ref 1.7–7.7)
NEUTROPHILS RELATIVE PERCENT: 62.9 %
PDW BLD-RTO: 13.9 % (ref 12.4–15.4)
PLATELET # BLD: 225 K/UL (ref 135–450)
PMV BLD AUTO: 8.4 FL (ref 5–10.5)
POTASSIUM REFLEX MAGNESIUM: 4.1 MMOL/L (ref 3.5–5.1)
RBC # BLD: 4.72 M/UL (ref 4.2–5.9)
SODIUM BLD-SCNC: 139 MMOL/L (ref 136–145)
TOTAL PROTEIN: 7 G/DL (ref 6.4–8.2)
TROPONIN: <0.01 NG/ML
TROPONIN: <0.01 NG/ML
WBC # BLD: 7.6 K/UL (ref 4–11)

## 2020-09-15 PROCEDURE — 99285 EMERGENCY DEPT VISIT HI MDM: CPT

## 2020-09-15 PROCEDURE — 93010 ELECTROCARDIOGRAM REPORT: CPT | Performed by: INTERNAL MEDICINE

## 2020-09-15 PROCEDURE — 71046 X-RAY EXAM CHEST 2 VIEWS: CPT

## 2020-09-15 PROCEDURE — 93005 ELECTROCARDIOGRAM TRACING: CPT | Performed by: STUDENT IN AN ORGANIZED HEALTH CARE EDUCATION/TRAINING PROGRAM

## 2020-09-15 PROCEDURE — 6370000000 HC RX 637 (ALT 250 FOR IP): Performed by: STUDENT IN AN ORGANIZED HEALTH CARE EDUCATION/TRAINING PROGRAM

## 2020-09-15 PROCEDURE — 85025 COMPLETE CBC W/AUTO DIFF WBC: CPT

## 2020-09-15 PROCEDURE — 2580000003 HC RX 258: Performed by: NURSE PRACTITIONER

## 2020-09-15 PROCEDURE — G0378 HOSPITAL OBSERVATION PER HR: HCPCS

## 2020-09-15 PROCEDURE — 84484 ASSAY OF TROPONIN QUANT: CPT

## 2020-09-15 PROCEDURE — 6360000002 HC RX W HCPCS: Performed by: NURSE PRACTITIONER

## 2020-09-15 PROCEDURE — 80053 COMPREHEN METABOLIC PANEL: CPT

## 2020-09-15 PROCEDURE — 36415 COLL VENOUS BLD VENIPUNCTURE: CPT

## 2020-09-15 PROCEDURE — 96372 THER/PROPH/DIAG INJ SC/IM: CPT

## 2020-09-15 PROCEDURE — 6370000000 HC RX 637 (ALT 250 FOR IP): Performed by: NURSE PRACTITIONER

## 2020-09-15 RX ORDER — ASPIRIN 81 MG/1
324 TABLET, CHEWABLE ORAL ONCE
Status: COMPLETED | OUTPATIENT
Start: 2020-09-15 | End: 2020-09-15

## 2020-09-15 RX ORDER — PROMETHAZINE HYDROCHLORIDE 25 MG/1
12.5 TABLET ORAL EVERY 6 HOURS PRN
Status: DISCONTINUED | OUTPATIENT
Start: 2020-09-15 | End: 2020-09-16 | Stop reason: HOSPADM

## 2020-09-15 RX ORDER — SODIUM CHLORIDE 0.9 % (FLUSH) 0.9 %
10 SYRINGE (ML) INJECTION EVERY 12 HOURS SCHEDULED
Status: DISCONTINUED | OUTPATIENT
Start: 2020-09-15 | End: 2020-09-16 | Stop reason: HOSPADM

## 2020-09-15 RX ORDER — ONDANSETRON 2 MG/ML
4 INJECTION INTRAMUSCULAR; INTRAVENOUS EVERY 6 HOURS PRN
Status: DISCONTINUED | OUTPATIENT
Start: 2020-09-15 | End: 2020-09-16 | Stop reason: HOSPADM

## 2020-09-15 RX ORDER — LISINOPRIL 10 MG/1
10 TABLET ORAL DAILY
Status: DISCONTINUED | OUTPATIENT
Start: 2020-09-15 | End: 2020-09-16 | Stop reason: HOSPADM

## 2020-09-15 RX ORDER — POLYETHYLENE GLYCOL 3350 17 G/17G
17 POWDER, FOR SOLUTION ORAL DAILY PRN
Status: DISCONTINUED | OUTPATIENT
Start: 2020-09-15 | End: 2020-09-16 | Stop reason: HOSPADM

## 2020-09-15 RX ORDER — ATORVASTATIN CALCIUM 40 MG/1
40 TABLET, FILM COATED ORAL NIGHTLY
Status: DISCONTINUED | OUTPATIENT
Start: 2020-09-15 | End: 2020-09-16 | Stop reason: HOSPADM

## 2020-09-15 RX ORDER — ASCORBIC ACID 1000 MG
120 TABLET ORAL DAILY
COMMUNITY

## 2020-09-15 RX ORDER — POTASSIUM CHLORIDE 7.45 MG/ML
10 INJECTION INTRAVENOUS PRN
Status: DISCONTINUED | OUTPATIENT
Start: 2020-09-15 | End: 2020-09-16 | Stop reason: HOSPADM

## 2020-09-15 RX ORDER — SODIUM CHLORIDE 0.9 % (FLUSH) 0.9 %
10 SYRINGE (ML) INJECTION PRN
Status: DISCONTINUED | OUTPATIENT
Start: 2020-09-15 | End: 2020-09-16 | Stop reason: HOSPADM

## 2020-09-15 RX ORDER — POTASSIUM CHLORIDE 20 MEQ/1
40 TABLET, EXTENDED RELEASE ORAL PRN
Status: DISCONTINUED | OUTPATIENT
Start: 2020-09-15 | End: 2020-09-16 | Stop reason: HOSPADM

## 2020-09-15 RX ORDER — ACETAMINOPHEN 650 MG/1
650 SUPPOSITORY RECTAL EVERY 6 HOURS PRN
Status: DISCONTINUED | OUTPATIENT
Start: 2020-09-15 | End: 2020-09-16 | Stop reason: HOSPADM

## 2020-09-15 RX ORDER — CETIRIZINE HYDROCHLORIDE 10 MG/1
10 TABLET ORAL DAILY
Status: DISCONTINUED | OUTPATIENT
Start: 2020-09-15 | End: 2020-09-16 | Stop reason: HOSPADM

## 2020-09-15 RX ORDER — ACETAMINOPHEN 325 MG/1
650 TABLET ORAL EVERY 6 HOURS PRN
Status: DISCONTINUED | OUTPATIENT
Start: 2020-09-15 | End: 2020-09-16 | Stop reason: HOSPADM

## 2020-09-15 RX ORDER — PANTOPRAZOLE SODIUM 40 MG/1
40 TABLET, DELAYED RELEASE ORAL
Status: DISCONTINUED | OUTPATIENT
Start: 2020-09-15 | End: 2020-09-16 | Stop reason: HOSPADM

## 2020-09-15 RX ORDER — ASPIRIN 81 MG/1
81 TABLET, CHEWABLE ORAL DAILY
Status: DISCONTINUED | OUTPATIENT
Start: 2020-09-16 | End: 2020-09-16 | Stop reason: HOSPADM

## 2020-09-15 RX ORDER — ASCORBIC ACID 500 MG
500 TABLET ORAL DAILY
Status: DISCONTINUED | OUTPATIENT
Start: 2020-09-15 | End: 2020-09-16 | Stop reason: HOSPADM

## 2020-09-15 RX ADMIN — ENOXAPARIN SODIUM 40 MG: 40 INJECTION SUBCUTANEOUS at 18:43

## 2020-09-15 RX ADMIN — PANTOPRAZOLE SODIUM 40 MG: 40 TABLET, DELAYED RELEASE ORAL at 18:43

## 2020-09-15 RX ADMIN — LISINOPRIL 10 MG: 10 TABLET ORAL at 18:43

## 2020-09-15 RX ADMIN — Medication 10 ML: at 21:12

## 2020-09-15 RX ADMIN — ATORVASTATIN CALCIUM 40 MG: 40 TABLET, FILM COATED ORAL at 21:12

## 2020-09-15 RX ADMIN — ASPIRIN 324 MG: 81 TABLET, CHEWABLE ORAL at 10:07

## 2020-09-15 RX ADMIN — OXYCODONE HYDROCHLORIDE AND ACETAMINOPHEN 500 MG: 500 TABLET ORAL at 18:43

## 2020-09-15 ASSESSMENT — ENCOUNTER SYMPTOMS
ABDOMINAL PAIN: 0
SHORTNESS OF BREATH: 0
COUGH: 0
SORE THROAT: 0
BACK PAIN: 0
RHINORRHEA: 0
VOMITING: 0
NAUSEA: 0
PHOTOPHOBIA: 0

## 2020-09-15 ASSESSMENT — PAIN DESCRIPTION - DESCRIPTORS
DESCRIPTORS: STABBING;SHARP
DESCRIPTORS: SHARP

## 2020-09-15 ASSESSMENT — PAIN DESCRIPTION - LOCATION
LOCATION: CHEST

## 2020-09-15 ASSESSMENT — PAIN DESCRIPTION - ORIENTATION
ORIENTATION: MID
ORIENTATION: MID

## 2020-09-15 ASSESSMENT — HEART SCORE: ECG: 0

## 2020-09-15 ASSESSMENT — PAIN DESCRIPTION - PAIN TYPE
TYPE: ACUTE PAIN

## 2020-09-15 ASSESSMENT — PAIN SCALES - GENERAL
PAINLEVEL_OUTOF10: 1
PAINLEVEL_OUTOF10: 0
PAINLEVEL_OUTOF10: 1
PAINLEVEL_OUTOF10: 2

## 2020-09-15 ASSESSMENT — PAIN - FUNCTIONAL ASSESSMENT: PAIN_FUNCTIONAL_ASSESSMENT: ACTIVITIES ARE NOT PREVENTED

## 2020-09-15 ASSESSMENT — PAIN DESCRIPTION - PROGRESSION: CLINICAL_PROGRESSION: NOT CHANGED

## 2020-09-15 ASSESSMENT — PAIN DESCRIPTION - FREQUENCY: FREQUENCY: INTERMITTENT

## 2020-09-15 ASSESSMENT — PAIN DESCRIPTION - ONSET: ONSET: GRADUAL

## 2020-09-15 NOTE — ED PROVIDER NOTES
[address] ED  EMERGENCY DEPARTMENT ENCOUNTER      Pt Name: Jessica Ng  MRN: [id number]  [place] 1965  Date of evaluation: 9/15/2020  Provider: Kwaku Ardon, [address] [place] [address]       Chief Complaint   Patient presents with    Chest Pain     mid sternal chest pain x 1 day, fatigue x 1 week         HISTORY OF PRESENT ILLNESS   (Location/Symptom, Timing/Onset, Context/Setting, Quality, Duration, Modifying Factors, Severity)  Note limiting factors. Jessica Ng is a 54 y.o. male with a history of hypertension who presents to the emergency department complaining of chest pain. Patient describing substernal chest heaviness, squeezing sensation. Chest pain does not radiate. Is not associated with shortness of breath nausea vomiting or diaphoresis. Patient also complaining of exertional dyspnea worsening over the last several weeks. Denies history of coronary artery disease. Does have family history of heart disease. Denies recent illness no fevers chills cough shortness of breath. Denies history of DVT or PE no recent travel immobilization, hormone use, surgery, lower extremity asymmetry or posterior calf pain. Nursing Notes were reviewed. PAST MEDICAL HISTORY     Past Medical History:   Diagnosis Date    Chronic back pain     GERD (gastroesophageal reflux disease)     HTN (hypertension)          SURGICAL HISTORY       Past Surgical History:   Procedure Laterality Date    BACK SURGERY      x 2    CARPAL TUNNEL RELEASE      CERVICAL DISC SURGERY      COLONOSCOPY  1/28/16    KNEE SURGERY      LASIK Bilateral 2013    TONSILLECTOMY           CURRENT MEDICATIONS       Previous Medications    ASCORBIC ACID (VITAMIN C) 500 MG TABLET    Take 500 mg by mouth daily.     GINKGO BILOBA 40 MG TABS    Take 120 mg by mouth daily    LISINOPRIL (PRINIVIL;ZESTRIL) 10 MG TABLET    Take 1 tablet by mouth daily    LORATADINE (CLARITIN) 10 MG CAPSULE    Take 10 mg by mouth daily MELOXICAM (MOBIC) 15 MG TABLET    Take 1 tablet by mouth daily    METHOCARBAMOL (ROBAXIN) 750 MG TABLET    Take 1 tablet by mouth 4 times daily    OMEPRAZOLE (PRILOSEC) 20 MG DELAYED RELEASE CAPSULE    TAKE 1 CAPSULE BY MOUTH  TWICE DAILY    POTASSIUM GLUCONATE 550 MG TABS    Take 550 mg by mouth 2 times daily        ALLERGIES     No known allergies    FAMILY HISTORY       Family History   Problem Relation Age of Onset    High Blood Pressure Mother     Diabetes Mother     Kidney Disease Mother     Heart Disease Mother     Parkinsonism Father     Dementia Father     Cancer Father     Other Brother         motocycle accident          SOCIAL HISTORY       Social History     Socioeconomic History    Marital status: Single     Spouse name: None    Number of children: None    Years of education: None    Highest education level: None   Occupational History    None   Social Needs    Financial resource strain: None    Food insecurity     Worry: None     Inability: None    Transportation needs     Medical: None     Non-medical: None   Tobacco Use    Smoking status: Former Smoker     Packs/day: 1.50     Years: 21.00     Pack years: 31.50     Types: Cigarettes     Last attempt to quit: 2006     Years since quittin.9    Smokeless tobacco: Never Used   Substance and Sexual Activity    Alcohol use: Yes     Comment: Social Drinker    Drug use: No    Sexual activity: Yes     Partners: Female   Lifestyle    Physical activity     Days per week: None     Minutes per session: None    Stress: None   Relationships    Social connections     Talks on phone: None     Gets together: None     Attends Denominational service: None     Active member of club or organization: None     Attends meetings of clubs or organizations: None     Relationship status: None    Intimate partner violence     Fear of current or ex partner: None     Emotionally abused: None     Physically abused: None     Forced sexual activity: None Other Topics Concern    None   Social History Narrative    None       SCREENINGS        Shiela Coma Scale  Eye Opening: Spontaneous  Best Verbal Response: Oriented  Best Motor Response: Obeys commands  Shiela Coma Scale Score: 15 Heart Score for chest pain patients  History: Moderately Suspicious  ECG: Normal  Patient Age: > 39 and < 65 years  *Risk factors for Atherosclerotic disease: Positive family History, Hypertension, Obesity  Risk Factors: > 3 Risk factors or history of atherosclerotic disease*  Troponin: < 1X normal limit  Heart Score Total: 4                 REVIEW OF SYSTEMS    (2-9 systems for level 4, 10 or more for level 5)   Review of Systems   Constitutional: Positive for fatigue. Negative for chills and fever. HENT: Negative for congestion, rhinorrhea and sore throat. Eyes: Negative for photophobia and visual disturbance. Respiratory: Negative for cough and shortness of breath. Cardiovascular: Positive for chest pain. Negative for palpitations and leg swelling. Gastrointestinal: Negative for abdominal pain, nausea and vomiting. Genitourinary: Negative for decreased urine volume. Musculoskeletal: Negative for back pain, neck pain and neck stiffness. Skin: Negative for rash. Neurological: Negative for weakness, numbness and headaches. Psychiatric/Behavioral: Negative for confusion. PHYSICAL EXAM    (up to 7 for level 4, 8 or more for level 5)     ED Triage Vitals [09/15/20 0941]   BP Temp Temp Source Pulse Resp SpO2 Height Weight   (!) 137/91 98.2 °F (36.8 °C) Oral 72 16 99 % 6' 2\" (1.88 m) 240 lb (108.9 kg)       Physical Exam  Constitutional:       General: He is not in acute distress. Appearance: He is not diaphoretic. HENT:      Head: Normocephalic and atraumatic. Eyes:      Pupils: Pupils are equal, round, and reactive to light. Neck:      Musculoskeletal: Normal range of motion and neck supple. Trachea: No tracheal deviation.    Cardiovascular: Rate and Rhythm: Normal rate and regular rhythm. Pulmonary:      Effort: Pulmonary effort is normal. No respiratory distress. Breath sounds: No stridor. No wheezing. Abdominal:      General: There is no distension. Palpations: Abdomen is soft. Tenderness: There is no abdominal tenderness. Musculoskeletal: Normal range of motion. General: No deformity. Right lower leg: No edema. Left lower leg: No edema. Comments: There is no leg asymmetry or posterior calf pain. Skin:     General: Skin is warm and dry. Neurological:      Mental Status: He is alert and oriented to person, place, and time. DIAGNOSTIC RESULTS     EKG: All EKG's are interpreted by the Emergency Department Physician who either signs or Co-signs this chart in the absence of a cardiologist.      The Ekg interpreted by me shows  normal sinus rhythm with a rate of 61  Axis is   Normal  QTc is  normal  Intervals and Durations are unremarkable. ST Segments: no acute change    No significant change from prior EKG dated Feb 16, 2015        RADIOLOGY:   Non-plain film images such as CT, Ultrasound and MRI are read by the radiologist. Plain radiographic images are visualized and preliminarily interpreted by the emergency physician. Interpretation per the Radiologist below, if available at the time of this note:    XR CHEST (2 VW)   Final Result   No radiographic evidence of acute pulmonary disease.                LABS:  Labs Reviewed   COMPREHENSIVE METABOLIC PANEL W/ REFLEX TO MG FOR LOW K - Abnormal; Notable for the following components:       Result Value    Glucose 101 (*)     All other components within normal limits    Narrative:     Performed at:  [address] [address],  [place], [facility]   Phone [phone]   CBC WITH AUTO DIFFERENTIAL    Narrative:     Performed at:  [facility] [address] [address],  [place], [place] [address]   Phone [phone]   TROPONIN    Narrative:     Performed at:  [facility]  [address],  [place], [facility]   Phone [phone]       All other labs were within normal range or not returned as of this dictation. EMERGENCY DEPARTMENT COURSE and DIFFERENTIAL DIAGNOSIS/MDM:   Asaf Grullon is a 54 y.o. male who presents to the emergency department with the complaint of chest pain, onset around 7 AM this morning, while at rest.  Also complaining of fatigue in the last 1 to 2 weeks with exertional dyspnea. No history of CAD, does have family history of heart disease. Patient is obese, history of hypertension, prior to troponin evaluation patient with heart score of 4, will obtain cardiac studies likely admission for cardiac rule out. There is low clinical suspicion for DVT/PE in this patient he is not tachycardic and not hypoxic or tachypneic has no clinical signs of DVT on exam.  Low suspicion for aortic dissection but will evaluate chest x-ray for mediastinal widening, however pulses are symmetric and bilaterally equal.    Patient is initial work-up unremarkable however due to heart score of 4, with recent exertional dyspnea in today's worsening chest pain do feel prudent admit patient for cardiac rule out    Patient was discussed with hospitalist service, plan is for admission for further evaluation and management. CRITICAL CARE TIME   Total Critical Care time was at least  minutes, excluding separately reportable procedures. There was a high probability of clinically significant/life threatening deterioration in the patient's condition which required my urgent intervention. Clinical concern   Intervention     CONSULTS:  IP CONSULT TO HOSPITALIST    PROCEDURES:  Unless otherwise noted below, none     Procedures        FINAL IMPRESSION      1. Exertional dyspnea    2.  Chest pain, unspecified type          DISPOSITION/PLAN   DISPOSITION admit      PATIENT REFERRED TO:  Lucy Brewer MD  [address] [place] [address]. [address] [place] [address] [place]  [phone]            DISCHARGE MEDICATIONS:  New Prescriptions    No medications on file     Controlled Substances Monitoring:     No flowsheet data found.     (Please note that portions of this note were completed with a voice recognition program.  Efforts were made to edit the dictations but occasionally words are mis-transcribed.)    Nola Bah DO (electronically signed)  Attending Emergency Physician            Nola Bah DO  09/15/20 8446

## 2020-09-15 NOTE — ED NOTES
Bedside report given to [facility]. Pt placed on PCU telemetry transferred to  without difficulty.  Juani Lackey RN  09/15/20 2559

## 2020-09-15 NOTE — PROGRESS NOTES
Patient arrived to PCU in stable condition and denies any needs at this time. Patient is in stable condition and patients spouse at bedside.

## 2020-09-15 NOTE — ED NOTES
1156- Perfect serve sent to Dr. Dale Brito for admission   1200- Call was returned by Lion Dumont NP and spoke to Dr. Gama Montemayor  09/15/20 1501 [facility]  09/15/20 1211

## 2020-09-16 ENCOUNTER — APPOINTMENT (OUTPATIENT)
Dept: NUCLEAR MEDICINE | Age: 55
End: 2020-09-16
Payer: COMMERCIAL

## 2020-09-16 VITALS
OXYGEN SATURATION: 96 % | DIASTOLIC BLOOD PRESSURE: 76 MMHG | BODY MASS INDEX: 31.08 KG/M2 | TEMPERATURE: 98.4 F | WEIGHT: 242.2 LBS | RESPIRATION RATE: 15 BRPM | HEART RATE: 70 BPM | SYSTOLIC BLOOD PRESSURE: 122 MMHG | HEIGHT: 74 IN

## 2020-09-16 LAB
ANION GAP SERPL CALCULATED.3IONS-SCNC: 8 MMOL/L (ref 3–16)
BUN BLDV-MCNC: 13 MG/DL (ref 7–20)
CALCIUM SERPL-MCNC: 9.3 MG/DL (ref 8.3–10.6)
CHLORIDE BLD-SCNC: 102 MMOL/L (ref 99–110)
CHOLESTEROL, TOTAL: 137 MG/DL (ref 0–199)
CO2: 26 MMOL/L (ref 21–32)
CREAT SERPL-MCNC: 0.9 MG/DL (ref 0.9–1.3)
GFR AFRICAN AMERICAN: >60
GFR NON-AFRICAN AMERICAN: >60
GLUCOSE BLD-MCNC: 115 MG/DL (ref 70–99)
HCT VFR BLD CALC: 42.4 % (ref 40.5–52.5)
HDLC SERPL-MCNC: 29 MG/DL (ref 40–60)
HEMOGLOBIN: 14.2 G/DL (ref 13.5–17.5)
LDL CHOLESTEROL CALCULATED: 79 MG/DL
LV EF: 60 %
LVEF MODALITY: NORMAL
MCH RBC QN AUTO: 29.9 PG (ref 26–34)
MCHC RBC AUTO-ENTMCNC: 33.4 G/DL (ref 31–36)
MCV RBC AUTO: 89.5 FL (ref 80–100)
PDW BLD-RTO: 14 % (ref 12.4–15.4)
PLATELET # BLD: 229 K/UL (ref 135–450)
PMV BLD AUTO: 9 FL (ref 5–10.5)
POTASSIUM REFLEX MAGNESIUM: 4.1 MMOL/L (ref 3.5–5.1)
RBC # BLD: 4.73 M/UL (ref 4.2–5.9)
SODIUM BLD-SCNC: 136 MMOL/L (ref 136–145)
TRIGL SERPL-MCNC: 146 MG/DL (ref 0–150)
TROPONIN: <0.01 NG/ML
VLDLC SERPL CALC-MCNC: 29 MG/DL
WBC # BLD: 8 K/UL (ref 4–11)

## 2020-09-16 PROCEDURE — 80048 BASIC METABOLIC PNL TOTAL CA: CPT

## 2020-09-16 PROCEDURE — A9502 TC99M TETROFOSMIN: HCPCS | Performed by: INTERNAL MEDICINE

## 2020-09-16 PROCEDURE — 80061 LIPID PANEL: CPT

## 2020-09-16 PROCEDURE — 99220 PR INITIAL OBSERVATION CARE/DAY 70 MINUTES: CPT | Performed by: INTERNAL MEDICINE

## 2020-09-16 PROCEDURE — 36415 COLL VENOUS BLD VENIPUNCTURE: CPT

## 2020-09-16 PROCEDURE — 78452 HT MUSCLE IMAGE SPECT MULT: CPT

## 2020-09-16 PROCEDURE — 93017 CV STRESS TEST TRACING ONLY: CPT

## 2020-09-16 PROCEDURE — G0378 HOSPITAL OBSERVATION PER HR: HCPCS

## 2020-09-16 PROCEDURE — 85027 COMPLETE CBC AUTOMATED: CPT

## 2020-09-16 PROCEDURE — 84484 ASSAY OF TROPONIN QUANT: CPT

## 2020-09-16 PROCEDURE — 3430000000 HC RX DIAGNOSTIC RADIOPHARMACEUTICAL: Performed by: INTERNAL MEDICINE

## 2020-09-16 RX ORDER — NITROGLYCERIN 0.4 MG/1
0.4 TABLET SUBLINGUAL EVERY 5 MIN PRN
Status: DISCONTINUED | OUTPATIENT
Start: 2020-09-16 | End: 2020-09-16 | Stop reason: HOSPADM

## 2020-09-16 RX ADMIN — TETROFOSMIN 32.1 MILLICURIE: 1.38 INJECTION, POWDER, LYOPHILIZED, FOR SOLUTION INTRAVENOUS at 08:33

## 2020-09-16 RX ADMIN — TETROFOSMIN 10.5 MILLICURIE: 1.38 INJECTION, POWDER, LYOPHILIZED, FOR SOLUTION INTRAVENOUS at 07:29

## 2020-09-16 ASSESSMENT — PAIN SCALES - GENERAL: PAINLEVEL_OUTOF10: 0

## 2020-09-16 NOTE — PROGRESS NOTES
DC instructions reviewed with patient with a verbal understanding. Patients IV and telemetry monitor removed. Patient was dressed and stated he had all of his belongings before being walked out to [address] where his car was.

## 2020-09-16 NOTE — PROGRESS NOTES
Pt completed stress portion of NM stress. Pt waiting for second scan and then will return to assigned room in PCU. Pt denies any change in chest pressure.

## 2020-09-16 NOTE — FLOWSHEET NOTE
Shift assessment complete. See flow sheet. Patient resting comfortably in bed. Respirations easy and even. Pt complains of mild chest pain, not requiring analgesia at this time. Meds given per MAR. Call light and bedside table in reach. Will follow.      09/15/20 2121   Vital Signs   Temp 98.2 °F (36.8 °C)   Temp Source Oral   Pulse 67   Heart Rate Source Monitor   Resp 18   /72   BP Location Left Arm   BP Upper/Lower Upper   MAP (mmHg) 90   Level of Consciousness 0   MEWS Score 1   Patient Currently in Pain Yes   Pain Assessment   Pain Assessment 0-10   Pain Level 1   Pain Type Acute pain   Pain Location Chest   Pain Orientation Mid   Pain Descriptors Sharp   Patient's Stated Pain Goal No pain   Pain Frequency Intermittent   Pain Onset Gradual   Clinical Progression Not changed   Functional Pain Assessment Activities are not prevented   Oxygen Therapy   SpO2 95 %   O2 Device None (Room air)

## 2020-09-16 NOTE — PROGRESS NOTES
Report received from Tereso Lovett [facility]. Pt expresses no needs at this time. Care transferred.

## 2020-09-16 NOTE — PROGRESS NOTES
Pt educated on cardiac stress testing. Pt verbalizes understanding to cardiac stress testing. Questions and concerns addressed. Pt is agreeable to proceed with stress testing. Pt c/o\"mild\" constant chest pressure.

## 2020-09-16 NOTE — H&P
Combined History and Physical and d/c summary      PCP: Lucy Brewer MD    Date of Admission: 9/15/2020    Date of Service: Pt seen/examined on 9/16/2020    Chief Complaint:    Chief Complaint   Patient presents with    Chest Pain     mid sternal chest pain x 1 day, fatigue x 1 week         History Of Present Illness: The patient is a 54 y.o. male with a PMH of HTN who presented to the ED with complaint of midsternal chest pain. Came on at work while walking around. Lasted for: about 15-30 seconds. Described as a stabbing pain. Reports at times it feels like a pulling musclar pain. Intermittent in nature, did worsen over several hours. Does report DAMICO over the last couple of weeks but no chest pain w/ exertion. Associated symptoms of SOB but denies N/V or diaphoresis, palpitations. Does not have a hx of heart disease. Reports his mother had a hx of CAD in her 46s. No recent bronchitis or PNA. No hx of VTE or positive risk factors. Does report more stress at work. No worsening GERD, anxiety, or injury to chest wall. Denies recent heavy lifting or strenous exercise. RF: HTN & family hx - mother ~ age 48; no DM, HLD, he is a former smoker - quit in 2006    Past Medical History:        Diagnosis Date    Arthritis     Chronic back pain     GERD (gastroesophageal reflux disease)     HTN (hypertension)        Past Surgical History:        Procedure Laterality Date    BACK SURGERY      x 2    CARPAL TUNNEL RELEASE      CERVICAL DISC SURGERY      COLONOSCOPY  1/28/16    EYE SURGERY      KNEE SURGERY      LASIK Bilateral 2013    TONSILLECTOMY         Medications Prior to Admission:    Prior to Admission medications    Medication Sig Start Date End Date Taking?  Authorizing Provider   Ginkgo Biloba 40 MG TABS Take 120 mg by mouth daily   Yes Historical Provider, MD   meloxicam (MOBIC) 15 MG tablet Take 1 tablet by mouth daily 7/20/20  Yes JALEN Bynum - CNP   lisinopril (PRINIVIL;ZESTRIL) 10 MG [General Appearance - Well Developed] : well developed tablet Take 1 tablet by mouth daily 7/20/20  Yes Niurka Peacock, APRN - CNP   methocarbamol (ROBAXIN) 750 MG tablet Take 1 tablet by mouth 4 times daily 11/13/19  Yes Margaret Babcock MD   omeprazole (PRILOSEC) 20 MG delayed release capsule TAKE 1 CAPSULE BY MOUTH  TWICE DAILY  Patient taking differently: Take 20 mg by mouth Daily  10/25/19  Yes Margaret Babcock MD   ascorbic acid (VITAMIN C) 500 MG tablet Take 500 mg by mouth daily. Yes Historical Provider, MD   Potassium Gluconate 550 MG TABS Take 550 mg by mouth 2 times daily    Yes Historical Provider, MD       Allergies:  No known allergies    Social History:  The patient currently lives at home. TOBACCO:   reports that he quit smoking about 13 years ago. His smoking use included cigarettes. He has a 31.50 pack-year smoking history. He has never used smokeless tobacco.  ETOH:   reports current alcohol use. Family History:   Positive as follows:        Problem Relation Age of Onset    High Blood Pressure Mother     Diabetes Mother     Kidney Disease Mother     Heart Disease Mother     Parkinsonism Father     Dementia Father     Cancer Father     Other Brother         motocycle accident       REVIEW OF SYSTEMS:     Constitutional: Negative for fever   HENT: Negative for sore throat   Eyes: Negative for redness   Respiratory: + dyspnea on exertion, no cough   Cardiovascular: + chest pain   Gastrointestinal: Negative for vomiting, diarrhea   Genitourinary: Negative for hematuria   Musculoskeletal: Negative for arthralgias   Skin: Negative for rash   Neurological: Negative for syncope   Hematological: Negative for adenopathy   Psychiatric/Behavorial: Negative for anxiety    PHYSICAL EXAM:    /70   Pulse 66   Temp 98.4 °F (36.9 °C) (Oral)   Resp 16   Ht 6' 2\" (1.88 m)   Wt 242 lb 3.2 oz (109.9 kg)   SpO2 94%   BMI 31.10 kg/m²   Gen: No distress. Alert. Pleasant middle aged  male  Eyes: No sclera icterus. No conjunctival injection. Neck: Trachea midline. Resp: No accessory muscle use. No crackles. No wheezes. No rhonchi. CV: Regular rate. Regular rhythm. No murmur. No rub. No edema. GI: Soft, Non-tender. Non-distended. Normal bowel sounds. Skin: Warm and dry. No rash on exposed extremities. M/S: mild anterior chest wall TTP just left of mid-sternum  Neuro: Awake. Grossly non-focal, ambulates independently, normal gait    Psych: Oriented x 3. No anxiety or agitation. I Mary Kate Perez have reviewed the chart on [facility] and personally interviewed and examined patient, reviewed the data (labs and imaging) and after discussion with my PA formulated the plan. Agree with note with the following edits. HPI: 42-year-old male with a history of hypertension presented emergency room midsternal chest pain. He describes the sensation as a pressure sensation. No radiation. No clear relationship to exertion. Pain not pleuritic. I reviewed the patient's Past Medical History, Past Surgical History, Medications, and Allergies. Physical exam:    /76   Pulse 70   Temp 98.4 °F (36.9 °C) (Oral)   Resp 15   Ht 6' 2\" (1.88 m)   Wt 242 lb 3.2 oz (109.9 kg)   SpO2 96%   BMI 31.10 kg/m²     Gen: No distress. Alert. Eyes: PERRL. No sclera icterus. No conjunctival injection. ENT: No discharge. Pharynx clear. Neck: Trachea midline. Normal thyroid. Resp: No accessory muscle use. No crackles. No wheezes. No rhonchi. No dullness on percussion. CV: Regular rate. Regular rhythm. No murmur or rub. No edema. GI: Non-tender. Non-distended. No masses. No organomegaly. Normal bowel sounds. No hernia. Skin: Warm and dry. No nodule on exposed extremities. No rash on exposed extremities. Lymph: No cervical LAD. No supraclavicular LAD. M/S: No cyanosis. No joint deformity. No clubbing. Neuro: Awake. Moves all 4 extremities, non focal  Psych: Oriented x 3. No anxiety or agitation. [Normal Appearance] : normal appearance [Well Groomed] : well groomed Chest pain. Atypical.  EKG unremarkable. Troponins are negative. GXT Myoview negative    D/c home in stable condition    JASPAL Rodriguez. [General Appearance - Well Nourished] : well nourished [No Deformities] : no deformities [General Appearance - In No Acute Distress] : no acute distress [Normal Conjunctiva] : the conjunctiva exhibited no abnormalities [Eyelids - No Xanthelasma] : the eyelids demonstrated no xanthelasmas [Normal Oral Mucosa] : normal oral mucosa [No Oral Pallor] : no oral pallor [No Oral Cyanosis] : no oral cyanosis [Normal Jugular Venous A Waves Present] : normal jugular venous A waves present [No Jugular Venous Gusman A Waves] : no jugular venous gusman A waves [Normal Jugular Venous V Waves Present] : normal jugular venous V waves present [Murmurs] : no murmurs present [Heart Sounds] : normal S1 and S2 [Heart Rate And Rhythm] : heart rate and rhythm were normal [Respiration, Rhythm And Depth] : normal respiratory rhythm and effort [Exaggerated Use Of Accessory Muscles For Inspiration] : no accessory muscle use [Auscultation Breath Sounds / Voice Sounds] : lungs were clear to auscultation bilaterally [Abdomen Soft] : soft [Abdomen Tenderness] : non-tender [Abnormal Walk] : normal gait [Abdomen Mass (___ Cm)] : no abdominal mass palpated [Gait - Sufficient For Exercise Testing] : the gait was sufficient for exercise testing [Nail Clubbing] : no clubbing of the fingernails [Cyanosis, Localized] : no localized cyanosis [Petechial Hemorrhages (___cm)] : no petechial hemorrhages [Skin Color & Pigmentation] : normal skin color and pigmentation [Skin Lesions] : no skin lesions [No Venous Stasis] : no venous stasis [] : no rash [No Xanthoma] : no  xanthoma was observed [No Skin Ulcers] : no skin ulcer [Mood] : the mood was normal [Affect] : the affect was normal [Oriented To Time, Place, And Person] : oriented to person, place, and time [No Anxiety] : not feeling anxious

## 2020-09-16 NOTE — PLAN OF CARE
Problem: Pain:  Goal: Pain level will decrease  Description: Pain level will decrease  Outcome: Ongoing  Goal: Control of acute pain  Description: Control of acute pain  Outcome: Ongoing  Goal: Control of chronic pain  Description: Control of chronic pain  Outcome: Ongoing   Pt will use pain scale 0-10 appropriately. Will assess for pain frequently. Will medicate with pain meds as ordered.

## 2020-09-23 ENCOUNTER — OFFICE VISIT (OUTPATIENT)
Dept: FAMILY MEDICINE CLINIC | Age: 55
End: 2020-09-23
Payer: COMMERCIAL

## 2020-09-23 VITALS
SYSTOLIC BLOOD PRESSURE: 116 MMHG | OXYGEN SATURATION: 98 % | DIASTOLIC BLOOD PRESSURE: 78 MMHG | HEART RATE: 62 BPM | WEIGHT: 242 LBS | TEMPERATURE: 97.8 F | BODY MASS INDEX: 31.07 KG/M2

## 2020-09-23 PROCEDURE — G8417 CALC BMI ABV UP PARAM F/U: HCPCS | Performed by: FAMILY MEDICINE

## 2020-09-23 PROCEDURE — 1036F TOBACCO NON-USER: CPT | Performed by: FAMILY MEDICINE

## 2020-09-23 PROCEDURE — G8427 DOCREV CUR MEDS BY ELIG CLIN: HCPCS | Performed by: FAMILY MEDICINE

## 2020-09-23 PROCEDURE — 99214 OFFICE O/P EST MOD 30 MIN: CPT | Performed by: FAMILY MEDICINE

## 2020-09-23 PROCEDURE — 3017F COLORECTAL CA SCREEN DOC REV: CPT | Performed by: FAMILY MEDICINE

## 2020-09-23 ASSESSMENT — PATIENT HEALTH QUESTIONNAIRE - PHQ9
SUM OF ALL RESPONSES TO PHQ QUESTIONS 1-9: 0
SUM OF ALL RESPONSES TO PHQ9 QUESTIONS 1 & 2: 0
1. LITTLE INTEREST OR PLEASURE IN DOING THINGS: 0
2. FEELING DOWN, DEPRESSED OR HOPELESS: 0
SUM OF ALL RESPONSES TO PHQ QUESTIONS 1-9: 0

## 2020-09-23 NOTE — PROGRESS NOTES
2020     Parker Walker (:  1965) is a 54 y.o. male, here for evaluation of the following medical concerns:    Ellen Juárez is a 54 y.o. male. Patient presents with: Follow-Up from Hospital: [facility] Chest pain 9/15-    The patient is a 54 y.o. male with a PMH of HTN who presented to the ED with complaint of midsternal chest pain. Came on at work while walking around. Lasted for: about 15-30 seconds. Described as a stabbing pain. Reports at times it feels like a pulling musclar pain. Intermittent in nature, did worsen over several hours. Does report DAMICO over the last couple of weeks but no chest pain w/ exertion. Associated symptoms of SOB but denies N/V or diaphoresis, palpitations. Does not have a hx of heart disease. Reports his mother had a hx of CAD in her 46s. No recent bronchitis or PNA. No hx of VTE or positive risk factors. Does report more stress at work. No worsening GERD, anxiety, or injury to chest wall. Denies recent heavy lifting or strenous exercise. The patients PMH, surgical history, family history, medications, allergies were all reviewed and updated as appropriate today. Review of Systems    Prior to Visit Medications    Medication Sig Taking? Authorizing Provider   Ginkgo Biloba 40 MG TABS Take 120 mg by mouth daily Yes Historical Provider, MD   meloxicam (MOBIC) 15 MG tablet Take 1 tablet by mouth daily Yes JALEN Abernathy CNP   lisinopril (PRINIVIL;ZESTRIL) 10 MG tablet Take 1 tablet by mouth daily Yes JALEN Abernathy CNP   methocarbamol (ROBAXIN) 750 MG tablet Take 1 tablet by mouth 4 times daily Yes Jw Lantigua MD   omeprazole (PRILOSEC) 20 MG delayed release capsule TAKE 1 CAPSULE BY MOUTH  TWICE DAILY  Patient taking differently: Take 20 mg by mouth Daily  Yes Jw Lantigua MD   ascorbic acid (VITAMIN C) 500 MG tablet Take 500 mg by mouth daily.  Yes Historical Provider, MD   Potassium Gluconate 550 MG TABS Take 550 mg by mouth 2 times daily  Yes Historical Provider, MD        Social History     Tobacco Use    Smoking status: Former Smoker     Packs/day: 1.50     Years: 21.00     Pack years: 31.50     Types: Cigarettes     Last attempt to quit: 2006     Years since quittin.9    Smokeless tobacco: Never Used   Substance Use Topics    Alcohol use: Yes     Comment: Social Drinker        Vitals:    20 1129   BP: 116/78   Pulse: 62   Temp: 97.8 °F (36.6 °C)   TempSrc: Temporal   SpO2: 98%   Weight: 242 lb (109.8 kg)     Estimated body mass index is 31.07 kg/m² as calculated from the following:    Height as of 9/15/20: 6' 2\" (1.88 m). Weight as of this encounter: 242 lb (109.8 kg). Physical Exam  Vitals signs and nursing note reviewed. Constitutional:       Appearance: He is well-developed. HENT:      Head: Normocephalic and atraumatic. Right Ear: External ear normal.      Left Ear: External ear normal.      Nose: Nose normal.   Eyes:      Conjunctiva/sclera: Conjunctivae normal.      Pupils: Pupils are equal, round, and reactive to light. Neck:      Musculoskeletal: Normal range of motion and neck supple. Cardiovascular:      Rate and Rhythm: Normal rate and regular rhythm. Heart sounds: Normal heart sounds. No murmur. No friction rub. No gallop. Pulmonary:      Effort: Pulmonary effort is normal. No respiratory distress. Breath sounds: Normal breath sounds. No wheezing. Abdominal:      General: Bowel sounds are normal. There is no distension. Palpations: Abdomen is soft. Tenderness: There is no abdominal tenderness. Comments: 1 cm sized umbilical hernia, not fully reducible. Slightly painful. Musculoskeletal: Normal range of motion. General: No tenderness. Skin:     General: Skin is warm and dry. Neurological:      Mental Status: He is alert and oriented to person, place, and time. Deep Tendon Reflexes: Reflexes are normal and symmetric.    Psychiatric: Behavior: Behavior normal.         Thought Content: Thought content normal.         Judgment: Judgment normal.         ASSESSMENT/PLAN:  1. SOB (shortness of breath)    - Full PFT Study With Bronchodilator; Future    2. Esophageal spasm    - Denise Williamson MD, Gastroenterology, [facility]    3. Umbilical hernia without obstruction and without gangrene    - Evon Bunch MD, General Surgery, [facility]      No follow-ups on file. An electronic signature was used to authenticate this note.     --Polly Brown MD on 9/23/2020 at 11:46 AM

## 2020-09-28 ENCOUNTER — INITIAL CONSULT (OUTPATIENT)
Dept: GASTROENTEROLOGY | Age: 55
End: 2020-09-28
Payer: COMMERCIAL

## 2020-09-28 ENCOUNTER — OFFICE VISIT (OUTPATIENT)
Dept: SURGERY | Age: 55
End: 2020-09-28
Payer: COMMERCIAL

## 2020-09-28 VITALS
WEIGHT: 246 LBS | HEIGHT: 74 IN | DIASTOLIC BLOOD PRESSURE: 74 MMHG | SYSTOLIC BLOOD PRESSURE: 122 MMHG | BODY MASS INDEX: 31.57 KG/M2

## 2020-09-28 VITALS
TEMPERATURE: 97.7 F | BODY MASS INDEX: 31.54 KG/M2 | DIASTOLIC BLOOD PRESSURE: 70 MMHG | HEART RATE: 69 BPM | WEIGHT: 245.8 LBS | HEIGHT: 74 IN | SYSTOLIC BLOOD PRESSURE: 128 MMHG

## 2020-09-28 PROCEDURE — G8427 DOCREV CUR MEDS BY ELIG CLIN: HCPCS | Performed by: SURGERY

## 2020-09-28 PROCEDURE — 3017F COLORECTAL CA SCREEN DOC REV: CPT | Performed by: INTERNAL MEDICINE

## 2020-09-28 PROCEDURE — 99204 OFFICE O/P NEW MOD 45 MIN: CPT | Performed by: INTERNAL MEDICINE

## 2020-09-28 PROCEDURE — G8427 DOCREV CUR MEDS BY ELIG CLIN: HCPCS | Performed by: INTERNAL MEDICINE

## 2020-09-28 PROCEDURE — G8417 CALC BMI ABV UP PARAM F/U: HCPCS | Performed by: INTERNAL MEDICINE

## 2020-09-28 PROCEDURE — G8417 CALC BMI ABV UP PARAM F/U: HCPCS | Performed by: SURGERY

## 2020-09-28 PROCEDURE — 1036F TOBACCO NON-USER: CPT | Performed by: INTERNAL MEDICINE

## 2020-09-28 PROCEDURE — 99213 OFFICE O/P EST LOW 20 MIN: CPT | Performed by: SURGERY

## 2020-09-28 RX ORDER — SODIUM, POTASSIUM,MAG SULFATES 17.5-3.13G
1 SOLUTION, RECONSTITUTED, ORAL ORAL ONCE
Qty: 1 BOTTLE | Refills: 0 | Status: SHIPPED | OUTPATIENT
Start: 2020-09-28 | End: 2020-09-28

## 2020-09-28 RX ORDER — SODIUM CHLORIDE 0.9 % (FLUSH) 0.9 %
10 SYRINGE (ML) INJECTION EVERY 12 HOURS SCHEDULED
Status: CANCELLED | OUTPATIENT
Start: 2020-09-28

## 2020-09-28 RX ORDER — SODIUM CHLORIDE 0.9 % (FLUSH) 0.9 %
10 SYRINGE (ML) INJECTION PRN
Status: CANCELLED | OUTPATIENT
Start: 2020-09-28

## 2020-09-28 NOTE — PATIENT INSTRUCTIONS
Joshua Gutierrez    [address] [place] [address] ,  [address] [facility]  Phone: [phone]  [facility],  [address] [place] [address], [address] [place] [address]  Phone: [phone]    Sedation  Three types of sedation are used for endoscopy and colonoscopy. The standard and most common is called conscious sedation. This is administered by the gastroenterologist and is part of the standard procedure. Common medications used for this are IV forms of a benzodiazepine (most commonly Versed) and a narcotic (most commonly fentanyl). Benadryl and nausea medicines may also be used. The effect of this is to make you comfortable. Most people will actually have amnesia with this and not recall the procedure. Some individuals will have other types of anesthesia provided by an anesthesiologist or nurse anesthetist.  The reason for this is a history of poor sedation, medication use that makes one more resistant to conscious sedation, a medical condition for which conscious sedation is contraindicated or other medical unstable conditions. This usually involves a separate fee from anesthesia. The most common of these is propofol (diprivan sedation) which is a deeper sedative the conscious sedation. In some instances, general anesthesia with intubation (breathing tube) is required. If you need to cancel or reschedule, please do so at least 2 weeks before the procedure, so that we can be considerate to other patients who are waiting to be scheduled.     If you cancel or reschedule less than 7 days before your procedure, you will be placed on a lower priority list.    ENDOSCOPY OVERVIEW  An upper endoscopy, often referred to as endoscopy, EGD, or imlcvvbj-kgwmgc-wxltsmboatcv, is a procedure that allows a physician to directly examine the upper part of the gastrointestinal (GI) tract, which includes the esophagus (swallowing tube), the stomach, and the duodenum (the first section of the small intestine)  The physician who performs the procedures, known as an endoscopist, has special training in using an endoscope to examine the upper GI system, looking for inflammation (redness, irritation), bleeding, ulcers, or tumors. REASONS FOR UPPER ENDOSCOPY  The most common reasons for upper endoscopy include:  Unexplained discomfort in the upper abdomen   GERD or gastroesophageal reflux disease, (often called heartburn)   Persistent nausea and vomiting   Upper GI bleeding (vomiting blood or blood found in the stool that originated from the upper part of the gastrointestinal tract). Bleeding can be treated during the endoscopy. Difficulty swallowing; food/liquids getting stuck in the esophagus during swallowing. This may be caused by a narrowing (stricture) or tumor. The stricture may be dilated with special balloons or dilation tubes during the endoscopy. Abnormal or unclear findings on an upper GI x-ray, CT scan or MRI. Removal of a foreign body (a swallowed object). To check healing or progress on previously found polyps (growths), tumors, or ulcers. ENDOSCOPY PREPARATION  You will be given specific instructions regarding how to prepare for the examination before the procedure. These instructions are designed to maximize your safety during and after the examination and to minimize possible complications. It is important to read the instructions ahead of time and follow them carefully. Do not hesitate to call the physician's office or the endoscopy unit if there are questions. Nothing to eat after midnight the day before the test. You may be asked not to eat or drink anything for up to eight hours before the test. It is important for your stomach to be empty to allow the endoscopist to visualize the entire area and to decrease the possibility of food or fluid being vomited into the lungs while under sedation (called aspiration).   You may be asked to adjust the dose of your medications or to stop specific medications (such as aspirin-like drugs) temporarily before the examination. You should discuss your medications with your physician before your appointment for the endoscopy. You should arrange for a friend or family member to escort you home after the examination. Although you will be awake by the time you are discharged, the medications used for sedation cause temporary changes in the reflexes and judgment and interfere with your ability to drive or make decisions (similar to the effects of alcohol). WHAT TO EXPECT DURING ENDOSCOPY  Prior to the endoscopy, the staff will review your medical and surgical history, including current medications. A physician will explain the procedure and ask you to sign a consent. Before signing the consent, you should understand all the benefits and risks of the procedure, and should have all of your questions answered. An intravenous line (a needle inserted into a vein in the hand or arm) will be started to deliver medications. You will be given a combination of a sedative (to help you relax), and a narcotic (to prevent discomfort). Although most patients are sedated for the examination, many tolerate the procedure well without any medication. Your vital signs (blood pressure, heart rate, and blood oxygen level) will be monitored before, during, and after the examination. The monitoring is not painful. Oxygen is often given during the procedure through a small tube that sits under the nose and is fitted around the ears. For safety reasons, dentures should be removed before the procedure. THE ENDOSCOPY PROCEDURE  The procedure typically takes between 10 and 20 minutes to complete. The endoscopy is performed while you lie on your left side. Sometimes the physician will give a medication to numb the throat (either a gargle or a spray). A plastic mouth guard is placed between the teeth to prevent damage to the teeth and scope.   The endoscope (also called a gastroscope) is a flexible tube that is about the size of a finger. The scope has a lens and a light source that allows the endoscopist to look into the scope to see the inner lining of the upper gastrointestinal tract, or to view it on a TV monitor. Most people have no difficulty swallowing the flexible gastroscope as a result of the sedating medications. Many people sleep during the test; others are very relaxed and generally not aware of the examination. An alternative procedure called transnasal endoscopy may be available in some facilities. This involves passing a very thin scope (about the size of a drinking straw) through the nose. You are not sedated but a medication is applied to the nose to prevent discomfort. A full examination can be performed with this instrument. The endoscopist may take tissue samples called biopsies (not painful), or perform specific treatments (such as dilation, removal of polyps, treatment of bleeding), depending upon what is found during the examination. Air is introduced through the scope to open the esophagus, stomach, and intestine, allowing the scope to be passed through these structures and improving the endoscopist's ability to see all of the structures. You may experience a mild discomfort as air is pushed into the intestinal tract. This is not harmful and belching may relieve the sensation. The endoscope does not interfere with breathing. Taking slow, deep breaths during the procedure may help you to relax. ENDOSCOPY RECOVERY  After the endoscopy, you will be observed for one to two hours while the sedative medication wears off. The medicines cause most people to temporarily feel tired or have difficulty concentrating and you should not drive or return to work after the procedure. The most common discomfort after the examination is a feeling of bloating as a result of the air introduced during the examination. This usually resolves quickly.  Some patients also have a mild sore throat. Most patients are able to eat shortly after the examination. ENDOSCOPY COMPLICATIONS  Upper endoscopy is a safe procedure and complications are uncommon. The following is a list of possible complications:  Aspiration (inhaling) of food or fluids into the lungs, the risk of which can be minimized by not eating or drinking for the recommended period of time before the examination. The endoscope can cause a tear or hole in the tissue being examined. This is a serious complication but fortunately occurs only rarely. Bleeding can occur from biopsies or the removal of polyps, although it is usually minimal and stops quickly on its own or can be easily controlled. Reactions to the sedative medications are possible; the endoscopy team (doctors and nurses) will ask about previous medication allergies or reactions and about health problems such as heart, lung, kidney, or liver disease. Providing this information to the team ensures a safer examination. The medications may produce irritation in the vein at the site of the intravenous line. If redness, swelling, or discomfort occurs, your should call your endoscopist or primary care provider, or the number given by the nurse at discharge. The following signs and symptoms should be reported immediately:  Severe abdominal pain (more than gas cramps)   A firm, distended abdomen   Vomiting   Any temperature elevation   Difficulty swallowing or severe throat pain   A crunching feeling under the skin of the neck    AFTER UPPER ENDOSCOPY  Most patients tolerate endoscopy very well and feel fine afterwards. Some fatigue is common after the examination, and you should plan to take it easy and relax the rest of the day. The endoscopist can describe the result of their examination before you leave the endoscopy unit. If biopsies have been taken or polyps removed, you should call for results within one to two weeks.     WHERE TO GET MORE INFORMATION  Your healthcare provider is the best source of information for questions and concerns related to your medical problem. The following organizations also provide reliable health information. [employer] (www.nlm.nih.gov/medlineplus/healthtopics. html)  The American Society of Gastrointestinal Endoscopy: (www.askasge. org)  [employer] of Diabetes and Digestive and Kidney Diseases (http://digestive. niddk.nih.gov/ddiseases/pubs/upperendoscopy/index. htm)  ______________________________________________________________________________________________________________________________________    COLONOSCOPY OVERVIEW  A colonoscopy is an exam of the lower part of the gastrointestinal tract, which is called the colon or large intestine (bowel). Colonoscopy is a safe procedure that provides information other tests may not be able to give. Patients who require colonoscopy often have questions and concerns about the procedure. Colonoscopy is performed by inserting a device called a colonoscope into the anus and advanced through the entire colon. The procedure generally takes between 20 minutes and one hour. Other tests that are sometimes used to screen for colon cancer, like virtual colonoscopy (also called CT colonography), are discussed separately. More detailed information about colonoscopy is available by subscription.     REASONS FOR COLONOSCOPY   The most common reasons for colonoscopy are to evaluate the following:        As a screening exam for colon cancer      Rectal bleeding      A change in bowel habits, like persistent diarrhea      Iron deficiency anemia (a decrease in blood count due to loss of iron)      A family history of colon cancer      As a follow-up test in people with colon polyps or colon cancer      Chronic, unexplained abdominal or rectal pain      An abnormal X-ray exam, like a barium enema or CT scan    COLONOSCOPY PREPARATION  Before colonoscopy, your colon must be completely cleaned out so that the doctor can see any abnormal areas. To clean the colon, you will take a strong laxative and empty your bowels the night before your test.    Your doctor's office will provide specific instructions about how you should prepare for colonoscopy. Be sure to read these instructions ahead of time so you will be prepared for the prep. If you have questions, call the doctor's office in advance. You will need to avoid solid food for at least one day before the test. You should also drink plenty of fluids on the day before the test. You can drink clear liquids up to several hours before your procedure, including:        Water      Clear broth (beef, chicken, or vegetable)      Coffee or tea (without milk)      Ices      Gelatin (avoid red gelatin)    The day or night before the colonoscopy, you will take a laxative in two parts:        A pill that you take by mouth      A powder that is mixed with water    The most common laxative treatment is called Go-Lytely® or Half-Lytely®. You can add a flavoring (included), which, unfortunately, only partially hides the unpleasant taste. Most doctors do not recommend that you add other flavorings to the solution. Refrigerating the solution can make it easier to drink. Drinking this solution may be the most unpleasant part of the exam. You will begin to have watery diarrhea within a short time after drinking the solution. If you become nauseated or vomit while drinking the solution, call your doctor or nurse for instructions. Medicines - You can take most prescription and nonprescription medicines right up to the day of the colonoscopy. Your doctor should tell you what medicines to stop. You should also tell the doctor if you are allergic to any medicines. Some medicines increase the risk of heavy bleeding if you have a biopsy during the colonoscopy.  Ask your doctor how and when to stop these medicines, including warfarin/Coumadin® and clopidogrel/Plavix®. Transportation home - You will be given a sedative (a medicine to help you relax) during the colonoscopy, so you will need someone to take you home after your test. Although you will be awake by the time you go home, the sedative medicines cause changes in reflexes and judgment that can interfere with your ability to make decisions, similar to the effect of alcohol. WHAT TO EXPECT  Before the test, a doctor will review the test, including possible complications, and will ask you to sign a consent form. The nurse will start an IV line in your hand or arm. Your blood pressure and heart rate will be monitored during the test.    THE COLONOSCOPY PROCEDURE  You will be given fluid and medicines through an IV line. Many people sleep during the test, while others are very relaxed, comfortable, and generally not aware. The colonoscope is a flexible tube, approximately the size of the index finger. The scope pumps air into the colon to inflate it and allow the doctor to see the entire lining. You might feel bloating or gas cramps as the air opens the colon. Try not to be embarrassed about passing this gas, and let your doctor know if you are uncomfortable. During the procedure, the doctor might take a biopsy (small pieces of tissue) or remove polyps. Polyps are growths of tissue that can range in size from the tip of a pen to several inches. Most polyps are benign (not cancerous). However, some polyps can become cancerous if allowed to grow for a long time. Having a polyp removed does not hurt. RECOVERY FROM COLONOSCOPY  After the colonoscopy, you will be observed in a recovery area until the effects of the sedative medication wear off. The most common complaint after colonoscopy is a feeling of bloating and gas cramps. You may also feel groggy from the sedation medications. You should not return to work or drive that day.  Most people are able to eat normally after the test. Ask your doctor when it is safe to restart aspirin and other blood-thinning medications. COLONOSCOPY COMPLICATIONS  Colonoscopy is a safe procedure, and complications are rare but can occur:        Bleeding can occur from biopsies or the removal of polyps, but it is usually minimal and can be controlled. The colonoscope can cause a tear or hole (perforation) in the colon. This is a serious problem, but it does not happen commonly. It is possible to have side effects from the sedative medicines. Although colonoscopy is the best test to examine the colon, it is possible for even the most skilled doctors to miss or overlook an abnormal area in the colon. You should call your doctor immediately if you have any of the following:        Severe abdominal pain (not just gas cramps)      A firm, bloated abdomen      Vomiting      Fever      Rectal bleeding (greater than a few tablespoons)    AFTER COLONOSCOPY  Although many people worry about being uncomfortable during a colonoscopy, most people tolerate it very well and feel fine afterward. It is normal to feel tired afterward. Plan to take it easy and relax the rest of the day. Your doctor can describe the results of the colonoscopy as soon as it is over. If s/he took biopsies or polyps, you should call for results within one to two weeks. We will make every attempt to get you your results by phone, mychart or sometimes a follow up visit, however, It is your responsibility to obtain your test results. If you do not get your results within 2 weeks, please call the office. Not all test results are available during your visit. If your test results are not back during the visit and you are still having symptoms, make an appointment with your caregiver to find out the results and any next steps. Do not assume everything is normal if you have not heard from your caregiver or the medical facility. It is important for you to follow up on all of your test results. WHERE TO GET MORE INFORMATION  Your healthcare provider is the best source of information for questions and concerns related to your medical problem. This article will be updated as needed every four months on our Web site ([email or web address]/patients). The following organizations also provide reliable health information. [employer] (www.nlm.nih.gov/medlineplus/colonoscopy.html)  [address] for Gastrointestinal Endoscopy  (www.asge.org/PatientInfoIndex. aspx?ps=069)

## 2020-09-28 NOTE — PROGRESS NOTES
New Patient Via Kye Villegas MD    [address], [address] [facility]  [place]  [phone]    Cherelle Poole   YOB: 1965    Date of Visit:  9/28/2020    Demi Mi MD    Chief Complaint: Umbilical pain    HPI: Patient presents for evaluation of pain and a bulge in his umbilicus. He states he has had this for a few years, but it has been worse over the course of the past few months. He has had increasing pain especially with straining. The pain is a sharp stabbing pain. It goes away when he relaxes. The bulge in his umbilicus always goes back in. He denies nausea or vomiting    Allergies   Allergen Reactions    No Known Allergies      Outpatient Medications Marked as Taking for the 9/28/20 encounter (Office Visit) with Esau Pablo MD   Medication Sig Dispense Refill    Ginkgo Biloba 40 MG TABS Take 120 mg by mouth daily      meloxicam (MOBIC) 15 MG tablet Take 1 tablet by mouth daily 90 tablet 1    lisinopril (PRINIVIL;ZESTRIL) 10 MG tablet Take 1 tablet by mouth daily 90 tablet 3    methocarbamol (ROBAXIN) 750 MG tablet Take 1 tablet by mouth 4 times daily 120 tablet 0    omeprazole (PRILOSEC) 20 MG delayed release capsule TAKE 1 CAPSULE BY MOUTH  TWICE DAILY (Patient taking differently: Take 20 mg by mouth Daily ) 180 capsule 1    ascorbic acid (VITAMIN C) 500 MG tablet Take 500 mg by mouth daily.       Potassium Gluconate 550 MG TABS Take 550 mg by mouth 2 times daily          Past Medical History:   Diagnosis Date    Arthritis     Chronic back pain     GERD (gastroesophageal reflux disease)     HTN (hypertension)      Past Surgical History:   Procedure Laterality Date    BACK SURGERY      x 2    CARPAL TUNNEL RELEASE      CERVICAL DISC SURGERY      COLONOSCOPY  1/28/16    EYE SURGERY      KNEE SURGERY      LASIK Bilateral 2013    TONSILLECTOMY       Family History   Problem Relation Age of Onset    High Blood Pressure Mother     Diabetes Mother     Kidney Disease Mother     Heart Disease Mother     Parkinsonism Father     Dementia Father     Cancer Father     Other Brother         motocycle accident     Social History     Socioeconomic History    Marital status: Single     Spouse name: Not on file    Number of children: Not on file    Years of education: Not on file    Highest education level: Not on file   Occupational History    Not on file   Social Needs    Financial resource strain: Not on file    Food insecurity     Worry: Not on file     Inability: Not on file   [employer] needs     Medical: Not on file     Non-medical: Not on file   Tobacco Use    Smoking status: Former Smoker     Packs/day: 1.50     Years: 21.00     Pack years: 31.50     Types: Cigarettes     Last attempt to quit: 2006     Years since quittin.0    Smokeless tobacco: Never Used   Substance and Sexual Activity    Alcohol use: Yes     Comment: Social Drinker    Drug use: No    Sexual activity: Yes     Partners: Female   Lifestyle    Physical activity     Days per week: Not on file     Minutes per session: Not on file    Stress: Not on file   Relationships    Social connections     Talks on phone: Not on file     Gets together: Not on file     Attends Orthodox service: Not on file     Active member of club or organization: Not on file     Attends meetings of clubs or organizations: Not on file     Relationship status: Not on file    Intimate partner violence     Fear of current or ex partner: Not on file     Emotionally abused: Not on file     Physically abused: Not on file     Forced sexual activity: Not on file   Other Topics Concern    Not on file   Social History Narrative    Not on file          Vitals:    20 0846   BP: 122/74   Site: Left Upper Arm   Position: Sitting   Cuff Size: Large Adult   Weight: 246 lb (111.6 kg)   Height: 6' 2\" (1.88 m)     Body mass index is 31.58 kg/m². Wt Readings from Last 3 Encounters:   09/28/20 246 lb (111.6 kg)   09/23/20 242 lb (109.8 kg)   09/16/20 242 lb 3.2 oz (109.9 kg)     BP Readings from Last 3 Encounters:   09/28/20 122/74   09/23/20 116/78   09/16/20 122/76        ROS:  As per HPI, otherwise reviewed ×10 systems and negative    PE:  Constitutional:  Well developed, well nourished, no acute distress, non-toxic appearance   Eyes:  PERRL, conjunctiva normal   HENT:  Atraumatic, external ears normal, nose normal. Neck- normal range of motion, no tenderness, supple   Respiratory:  No respiratory distress, normal breath sounds, no rales, no wheezing   Cardiovascular:  Normal rate, normal rhythm  GI: Bowel sounds positive, soft, mildly tender around the umbilicus where there is a small reducible bulge and a small underlying fascial defect  :  No costovertebral angle tenderness   Integument:  Well hydrated, no rash   Lymphatic:  No lymphadenopathy noted   Neurologic:  Alert & oriented x 3, no focal deficits noted   Psychiatric:  Speech and behavior appropriate       DATA:  Radiology Review: CT images from 2017 reviewed and do show an umbilical hernia containing fat      Assessment:  1. Umbilical hernia without obstruction and without gangrene        Plan: Umbilical hernia repair with mesh. I explained the procedure including risks, benefits, and alternatives. Questions were answered and the patient agrees to proceed.

## 2020-09-28 NOTE — LETTER
Dave [address] ,  [address] [facility]  Phone: [phone] [facility],  [address] [facility], [address] [place] [address]  Phone: [phone]   QPE:[phone]    09/28/20    Patient:Parker Walker  MR Number:<[id number]>  YOB: 1965  Date of Visit:9/28/20    Dear Dr. Fercho Ny MD    Thank you for the request for consultation for New [place]ella to me for the evaluation of   Chief Complaint   Patient presents with    New Patient     chest pain not heart related    . Below are the relevant portions of my assessment and plan of care. FINAL DIAGNOSIS/Assessment   Diagnosis Orders   1. Chest pain, unspecified type  EGD   2. Preop testing  Covid-19 Ambulatory   3. Screening for colon cancer  COLONOSCOPY W/ OR W/O BIOPSY    bisacodyl (DULCOLAX) 5 MG EC tablet    Na Sulfate-K Sulfate-Mg Sulf 17.5-3.13-1.6 GM/177ML SOLN    magnesium citrate solution       VISIT ORDERS/Plan  Orders Placed This Encounter   Procedures    COLONOSCOPY W/ OR W/O BIOPSY     Scheduling Instructions:      Schedule with anesthesia provided diprivan. Please provide prep of choice instructions and prescription. General guidelines for holding blood thinners/anticoagulants around endoscopic procedure are but patients are encouraged to check with their prescribing physician. The patient may hold Plavix, Effient, Brilinta 5 days prior to the procedure unless:       A drug eluting stent has been placed within past 12 months. A nondrug eluting stent has been placed within past 1 month. Coumadin may be held 4 days prior to the procedure unless:        Mechanical mitral valve replacement (requires heparin bridge while Coumadin held and is managed by pharmacy)      Pradaxa, Xarelto, Eliquis may be held 2-3 days prior to procedure.   According to pharmacokinetics of the drug, package insert, cardiology practice patterns, and T1/2 of theses drugs (12 hrs), Eliquis and Xarelto are held 48hrs prior to any procedure, including major surgical procedures w/o       increased bleeding.  That is usually the standard of care, as coagulation would/should be normalized at 48hrs. Every attempt should be made to maintain ASA 81mg per day throughout the lukas-operative period in patients with diagnosis of ASHD. These recommendations may need to be modified by the provider/ based on risk /benefit analysis of the procedure and the patients history. If anticoagulation can not be held because recent cardiac stent, elective endoscopic procedures should be delayed until they have received the minimum duration of recommended antiplatlet therapy and it can safely be held. Again if unsure, patient should discuss with prescribing physician/service. If anticoagulation can not be stopped, endoscopic procedures can still be performed either diagnostically at a somewhat higher risk. Understand that any therapeutic procedure where anything beyond looking is performed, carries higher risks. For this reason without overt bleeding other testing       such as cologuard may be more appropriate. High risk endoscopic procedures that require stopping antiplatelet and anticoagulation therapy include polypectomy, biliary or pancreatic sphincterotomy, pneumatic or bougie dilation, PEG placement, therapeutic balloon-assisted enteroscopy, EUS and FNA, tumor ablation by any technique,       cystogastrostomy,and treatment of varices. Order Specific Question:   Screening or Diagnostic?      Answer:   Diagnostic    Covid-19 Ambulatory     Standing Status:   Future     Standing Expiration Date:   9/28/2021     Scheduling Instructions:      Saline media preferred given current shortage of viral transport media but both acceptable     Order Specific Question:   Status Answer:   Asymptomatic/Surveillance (e.g. pre-op/pre-procedure, pre-delivery, transfer)     Order Specific Question:   Reason for Test     Answer:   Upcoming elective surgery/procedure/delivery, return to work, or discharge to another facility    EGD     Scheduling Instructions:      Schedule with anesthesia provided diprivan sedation. Please provide prep of choice instructions and prescription. General guidelines for holding blood thinners/anticoagulants around endoscopic procedure are but patients are encouraged to check with their prescribing physician. The patient may hold Plavix, Effient, Brilinta 5 days prior to the procedure unless:       A drug eluting stent has been placed within past 12 months. A nondrug eluting stent has been placed within past 1 month. Coumadin may be held 4 days prior to the procedure unless:        Mechanical mitral valve replacement (requires heparin bridge while Coumadin held and is managed by pharmacy)      Pradaxa, Xarelto, Eliquis may be held 2-3 days prior to procedure. According to pharmacokinetics of the drug, package insert, cardiology practice patterns, and T1/2 of theses drugs (12 hrs), Eliquis and Xarelto are held 48hrs prior to any procedure, including major surgical procedures w/o       increased bleeding.  That is usually the standard of care, as coagulation would/should be normalized at 48hrs. Every attempt should be made to maintain ASA 81mg per day throughout the lukas-operative period in patients with diagnosis of ASHD. These recommendations may need to be modified by the provider/ based on risk /benefit analysis of the procedure and the patients history.             If anticoagulation can not be held because recent cardiac stent, elective endoscopic procedures should be delayed until they have received the minimum duration of recommended antiplatlet therapy and it can safely be held. Again if unsure, patient should discuss with prescribing physician/service. If anticoagulation can not be stopped, endoscopic procedures can still be performed either diagnostically at a somewhat higher risk. Understand that any therapeutic procedure where anything beyond looking is performed, carries higher risks. For this reason without overt bleeding other testing       such as cologuard may be more appropriate. High risk endoscopic procedures that require stopping antiplatelet and anticoagulation therapy include polypectomy, biliary or pancreatic sphincterotomy, pneumatic or bougie dilation, PEG placement, therapeutic balloon-assisted enteroscopy, EUS and FNA, tumor ablation by any technique,       cystogastrostomy,and treatment of varices. Order Specific Question:   Screening or Diagnostic? Answer:   Diagnostic       If you have questions, please do not hesitate to call me. I look forward to following Parker Walker along with you.     Sincerely,        Yamile Pitts 9/28/20 1:17 PM EDT

## 2020-09-28 NOTE — PROGRESS NOTES
[address] [facility],  [address] [place] [address]  Phone: [phone] [address]     Chief Complaint   Patient presents with    New Patient     chest pain not heart related        HPI     Thank you Chaparro Aaron MD for asking me to see Parker Walker in consultation. He is a Single [1] White [1] 54 y.o. . male seen independently who presents with the following GI complaints:    Parker Walker  Complains of several weeks of chest discomfort described as mild in severity. No relationship to any specific including eating. No associated symptoms. No heartburn, dysphagia, cough, globus, regurgitation, nausea, or vomiting. Has been on prilosec for 10-12 years for initially heartburn with no breakthrough. Can not reproduce his symptom through movement or pressure in the area. Indicates has had a negative cardiac evaluation. No pertinent GI family history. Screening colonoscopy for cancer prevention of the colon. He has had a poor to fair bowel prep on his last 2 colonoscopies and a 5 year follow up was recommended on his last.  Does not feel he has constipation. Last Encounter Reviewed:   Pertinent PMH, FH, SH is reviewed below. Last EGD: none  Last Colonoscopy: 1/28/2016 mild diverticulosis    Review of available records reveals:   Wt Readings from Last 50 Encounters:   09/28/20 245 lb 12.8 oz (111.5 kg)   09/28/20 246 lb (111.6 kg)   09/23/20 242 lb (109.8 kg)   09/16/20 242 lb 3.2 oz (109.9 kg)   03/23/20 244 lb 14.9 oz (111.1 kg)   01/28/20 245 lb (111.1 kg)   11/13/19 249 lb (112.9 kg)   01/18/19 248 lb (112.5 kg)   07/16/18 247 lb (112 kg)   01/19/18 251 lb (113.9 kg)   12/22/17 256 lb 6.4 oz (116.3 kg)   09/09/17 245 lb (111.1 kg)   06/21/17 249 lb (112.9 kg)   05/17/17 247 lb (112 kg)   02/02/17 255 lb 15.3 oz (116.1 kg)   12/06/16 256 lb (116.1 kg)   09/27/16 251 lb (113.9 kg)   08/24/16 248 lb 9.6 oz (112.8 kg)   03/14/16 238 lb (108 Food insecurity     Worry: Not on file     Inability: Not on file    Transportation needs     Medical: Not on file     Non-medical: Not on file   Tobacco Use    Smoking status: Former Smoker     Packs/day: 1.50     Years: 21.00     Pack years: 31.50     Types: Cigarettes     Last attempt to quit: 2006     Years since quittin.0    Smokeless tobacco: Never Used   Substance and Sexual Activity    Alcohol use: Yes     Comment: Social Drinker    Drug use: No    Sexual activity: Yes     Partners: Female   Lifestyle    Physical activity     Days per week: Not on file     Minutes per session: Not on file    Stress: Not on file   Relationships    Social connections     Talks on phone: Not on file     Gets together: Not on file     Attends Taoist service: Not on file     Active member of club or organization: Not on file     Attends meetings of clubs or organizations: Not on file     Relationship status: Not on file    Intimate partner violence     Fear of current or ex partner: Not on file     Emotionally abused: Not on file     Physically abused: Not on file     Forced sexual activity: Not on file   Other Topics Concern    Not on file   Social History Narrative    Not on file     SURGICAL HISTORY     Past Surgical History:   Procedure Laterality Date    BACK SURGERY      x 2    [place] [address] [facility] COLONOSCOPY  16    EYE SURGERY      KNEE SURGERY      LASIK Bilateral 2013    TONSILLECTOMY       CURRENT MEDICATIONS   (This list may include medications prescribed during this encounter as epic can not insert only the list prior to this encounter.)  Current Outpatient Rx   Medication Sig Dispense Refill    bisacodyl (DULCOLAX) 5 MG EC tablet Take 4 tablets by mouth once for 1 dose Take as directed for colonoscopy.  4 tablet 0    Na Sulfate-K Sulfate-Mg Sulf 17.5-3.13-1.6 GM/177ML SOLN Take 1 kit by mouth once for 1 dose Insured 30% off (max $15) after first 09/23/20 242 lb (109.8 kg)     Constitutional: Well developed, Well nourished, No acute distress, Non-toxic appearance. HENT: Normocephalic, Atraumatic, Bilateral external ears normal, Oropharynx moist, No oral exudates, Nose normal.   Eyes: Conjunctiva normal, No discharge. Neck: Normal range of motion, No tenderness, Supple, No stridor. Lymphatic: No cervical, subclavian, or axillary lymphadenopathy. Cardiovascular: Normal heart rate, Normal rhythm, No murmurs, No rubs, No gallops. Thorax & Lungs: Normal breath sounds, No respiratory distress, No wheezing, No chest tenderness. No gynecomastia. Abdomen: scars consistent with stated surgeries, no hernias, no HSM, soft NTND   Rectal:  Deferred. Skin: Warm, Dry, No erythema, No rash. No bruising. No spider hemangiomas. Back: No tenderness, No CVA tenderness. Lower Extremities: Intact distal pulses, No edema, No tenderness, No cyanosis, No clubbing. Neurologic: Alert & oriented x 3, Normal motor function, Normal sensory function, No focal deficits noted. No asterixis. RADIOLOGY/PROCEDURES       FINAL IMPRESSION     Orders Placed This Encounter   Procedures    COLONOSCOPY W/ OR W/O BIOPSY     Scheduling Instructions:      Schedule with anesthesia provided diprivan. Please provide prep of choice instructions and prescription. General guidelines for holding blood thinners/anticoagulants around endoscopic procedure are but patients are encouraged to check with their prescribing physician. The patient may hold Plavix, Effient, Brilinta 5 days prior to the procedure unless:       A drug eluting stent has been placed within past 12 months. A nondrug eluting stent has been placed within past 1 month.       Coumadin may be held 4 days prior to the procedure unless:        Mechanical mitral valve replacement (requires heparin bridge while Coumadin held and is managed by pharmacy)      Pradaxa, Xarelto, Eliquis may be held 2-3 days prior to procedure. According to pharmacokinetics of the drug, package insert, cardiology practice patterns, and T1/2 of theses drugs (12 hrs), Eliquis and Xarelto are held 48hrs prior to any procedure, including major surgical procedures w/o       increased bleeding.  That is usually the standard of care, as coagulation would/should be normalized at 48hrs. Every attempt should be made to maintain ASA 81mg per day throughout the lukas-operative period in patients with diagnosis of ASHD. These recommendations may need to be modified by the provider/ based on risk /benefit analysis of the procedure and the patients history. If anticoagulation can not be held because recent cardiac stent, elective endoscopic procedures should be delayed until they have received the minimum duration of recommended antiplatlet therapy and it can safely be held. Again if unsure, patient should discuss with prescribing physician/service. If anticoagulation can not be stopped, endoscopic procedures can still be performed either diagnostically at a somewhat higher risk. Understand that any therapeutic procedure where anything beyond looking is performed, carries higher risks. For this reason without overt bleeding other testing       such as cologuard may be more appropriate. High risk endoscopic procedures that require stopping antiplatelet and anticoagulation therapy include polypectomy, biliary or pancreatic sphincterotomy, pneumatic or bougie dilation, PEG placement, therapeutic balloon-assisted enteroscopy, EUS and FNA, tumor ablation by any technique,       cystogastrostomy,and treatment of varices. Order Specific Question:   Screening or Diagnostic?      Answer:   Diagnostic    Covid-19 Ambulatory     Standing Status:   Future     Standing Expiration Date:   9/28/2021     Scheduling Instructions:      Saline media preferred given current shortage of viral transport media but both acceptable     Order Specific Question:   Status     Answer:   Asymptomatic/Surveillance (e.g. pre-op/pre-procedure, pre-delivery, transfer)     Order Specific Question:   Reason for Test     Answer:   Upcoming elective surgery/procedure/delivery, return to work, or discharge to another facility    EGD     Scheduling Instructions:      Schedule with anesthesia provided diprivan sedation. Please provide prep of choice instructions and prescription. General guidelines for holding blood thinners/anticoagulants around endoscopic procedure are but patients are encouraged to check with their prescribing physician. The patient may hold Plavix, Effient, Brilinta 5 days prior to the procedure unless:       A drug eluting stent has been placed within past 12 months. A nondrug eluting stent has been placed within past 1 month. Coumadin may be held 4 days prior to the procedure unless:        Mechanical mitral valve replacement (requires heparin bridge while Coumadin held and is managed by pharmacy)      Pradaxa, Xarelto, Eliquis may be held 2-3 days prior to procedure. According to pharmacokinetics of the drug, package insert, cardiology practice patterns, and T1/2 of theses drugs (12 hrs), Eliquis and Xarelto are held 48hrs prior to any procedure, including major surgical procedures w/o       increased bleeding.  That is usually the standard of care, as coagulation would/should be normalized at 48hrs. Every attempt should be made to maintain ASA 81mg per day throughout the lukas-operative period in patients with diagnosis of ASHD. These recommendations may need to be modified by the provider/ based on risk /benefit analysis of the procedure and the patients history.             If anticoagulation can not be held because recent cardiac stent, elective endoscopic procedures should be delayed until they have received the minimum duration of recommended antiplatlet therapy and it can safely be held. Again if unsure, patient should discuss with prescribing physician/service. If anticoagulation can not be stopped, endoscopic procedures can still be performed either diagnostically at a somewhat higher risk. Understand that any therapeutic procedure where anything beyond looking is performed, carries higher risks. For this reason without overt bleeding other testing       such as cologuard may be more appropriate. High risk endoscopic procedures that require stopping antiplatelet and anticoagulation therapy include polypectomy, biliary or pancreatic sphincterotomy, pneumatic or bougie dilation, PEG placement, therapeutic balloon-assisted enteroscopy, EUS and FNA, tumor ablation by any technique,       cystogastrostomy,and treatment of varices. Order Specific Question:   Screening or Diagnostic? Answer:   Pearl Jesus was seen today for new patient. Diagnoses and all orders for this visit:    Chest pain, unspecified type  -     EGD    Preop testing  -     Covid-19 Ambulatory; Future    Screening for colon cancer  -     COLONOSCOPY W/ OR W/O BIOPSY  -     bisacodyl (DULCOLAX) 5 MG EC tablet; Take 4 tablets by mouth once for 1 dose Take as directed for colonoscopy. -     Na Sulfate-K Sulfate-Mg Sulf 17.5-3.13-1.6 GM/177ML SOLN; Take 1 kit by mouth once for 1 dose Insured 30% off (max $15) after first $40 of co-pay/cash price [email or web address]  -     magnesium citrate solution; Take 296 mLs by mouth once for 1 dose        ORDERED FUTURE/PENDING TESTS     Lab Frequency Next Occurrence   Full PFT Study With Bronchodilator Once 09/23/2020   Initiate PAT Protocol Once 10/12/2020   COVID-19 Ambulatory Once 09/28/2020       FOLLOWUP   Return for EGD & Colonoscopy.           Jaime Ramseyarmen 9/28/20 1:01 PM EDT    CC:  Love Ramirez MD

## 2020-10-01 ENCOUNTER — OFFICE VISIT (OUTPATIENT)
Dept: PRIMARY CARE CLINIC | Age: 55
End: 2020-10-01
Payer: COMMERCIAL

## 2020-10-01 PROCEDURE — G8428 CUR MEDS NOT DOCUMENT: HCPCS | Performed by: NURSE PRACTITIONER

## 2020-10-01 PROCEDURE — 99211 OFF/OP EST MAY X REQ PHY/QHP: CPT | Performed by: NURSE PRACTITIONER

## 2020-10-01 PROCEDURE — G8417 CALC BMI ABV UP PARAM F/U: HCPCS | Performed by: NURSE PRACTITIONER

## 2020-10-01 NOTE — PROGRESS NOTES
Parker Walker received a viral test for COVID-19. They were educated on isolation and quarantine as appropriate. For any symptoms, they were directed to seek care from their PCP, given contact information to establish with a doctor, directed to an urgent care or the emergency room.

## 2020-10-01 NOTE — PATIENT INSTRUCTIONS
bathroom, if available. Animals: You should restrict contact with pets and other animals while you are sick with COVID-19, just like you would around other people. Although there have not been reports of pets or other animals becoming sick with COVID-19, it is still recommended that people sick with COVID-19 limit contact with animals until more information is known about the virus. When possible, have another member of your household care for your animals while you are sick. If you are sick with COVID-19, avoid contact with your pet, including petting, snuggling, being kissed or licked, and sharing food. If you must care for your pet or be around animals while you are sick, wash your hands before and after you interact with pets and wear a facemask. Call ahead before visiting your doctor  If you have a medical appointment, call the healthcare provider and tell them that you have or may have COVID-19. This will help the healthcare providers office take steps to keep other people from getting infected or exposed. Wear a facemask  You should wear a facemask when you are around other people (e.g., sharing a room or vehicle) or pets and before you enter a healthcare providers office. If you are not able to wear a facemask (for example, because it causes trouble breathing), then people who live with you should not stay in the same room with you, or they should wear a facemask if they enter your room. Cover your coughs and sneezes  Cover your mouth and nose with a tissue when you cough or sneeze. Throw used tissues in a lined trash can. Immediately wash your hands with soap and water for at least 20 seconds or, if soap and water are not available, clean your hands with an alcohol-based hand  that contains at least 60% alcohol.   Clean your hands often  Wash your hands often with soap and water for at least 20 seconds, especially after blowing your nose, coughing, or sneezing; going to the bathroom; and before eating or preparing food. If soap and water are not readily available, use an alcohol-based hand  with at least 60% alcohol, covering all surfaces of your hands and rubbing them together until they feel dry. Soap and water are the best option if hands are visibly dirty. Avoid touching your eyes, nose, and mouth with unwashed hands. Avoid sharing personal household items  You should not share dishes, drinking glasses, cups, eating utensils, towels, or bedding with other people or pets in your home. After using these items, they should be washed thoroughly with soap and water. Clean all high-touch surfaces everyday  High touch surfaces include counters, tabletops, doorknobs, bathroom fixtures, toilets, phones, keyboards, tablets, and bedside tables. Also, clean any surfaces that may have blood, stool, or body fluids on them. Use a household cleaning spray or wipe, according to the label instructions. Labels contain instructions for safe and effective use of the cleaning product including precautions you should take when applying the product, such as wearing gloves and making sure you have good ventilation during use of the product. Monitor your symptoms  Seek prompt medical attention if your illness is worsening (e.g., difficulty breathing). Before seeking care, call your healthcare provider and tell them that you have, or are being evaluated for, COVID-19. Put on a facemask before you enter the facility. These steps will help the healthcare providers office to keep other people in the office or waiting room from getting infected or exposed. Ask your healthcare provider to call the local or state health department. Persons who are placed under active monitoring or facilitated self-monitoring should follow instructions provided by their local health department or occupational health professionals, as appropriate. When working with your local health department check their available hours.   If you says you can return to your normal activities. Stay home except to get medical care  People who are mildly ill with COVID-19 are able to isolate at home during their illness. You should restrict activities outside your home, except for getting medical care. Do not go to work, school, or public areas. Avoid using public transportation, ride-sharing, or taxis. Separate yourself from other people and animals in your home  People: As much as possible, you should stay in a specific room and away from other people in your home. Also, you should use a separate bathroom, if available. Animals: You should restrict contact with pets and other animals while you are sick with COVID-19, just like you would around other people. Although there have not been reports of pets or other animals becoming sick with COVID-19, it is still recommended that people sick with COVID-19 limit contact with animals until more information is known about the virus. When possible, have another member of your household care for your animals while you are sick. If you are sick with COVID-19, avoid contact with your pet, including petting, snuggling, being kissed or licked, and sharing food. If you must care for your pet or be around animals while you are sick, wash your hands before and after you interact with pets and wear a facemask. Call ahead before visiting your doctor  If you have a medical appointment, call the healthcare provider and tell them that you have or may have COVID-19. This will help the healthcare providers office take steps to keep other people from getting infected or exposed. Wear a facemask  You should wear a facemask when you are around other people (e.g., sharing a room or vehicle) or pets and before you enter a healthcare providers office.  If you are not able to wear a facemask (for example, because it causes trouble breathing), then people who live with you should not stay in the same room with you, or they should wear a facemask if they enter your room. Cover your coughs and sneezes  Cover your mouth and nose with a tissue when you cough or sneeze. Throw used tissues in a lined trash can. Immediately wash your hands with soap and water for at least 20 seconds or, if soap and water are not available, clean your hands with an alcohol-based hand  that contains at least 60% alcohol. Clean your hands often  Wash your hands often with soap and water for at least 20 seconds, especially after blowing your nose, coughing, or sneezing; going to the bathroom; and before eating or preparing food. If soap and water are not readily available, use an alcohol-based hand  with at least 60% alcohol, covering all surfaces of your hands and rubbing them together until they feel dry. Soap and water are the best option if hands are visibly dirty. Avoid touching your eyes, nose, and mouth with unwashed hands. Avoid sharing personal household items  You should not share dishes, drinking glasses, cups, eating utensils, towels, or bedding with other people or pets in your home. After using these items, they should be washed thoroughly with soap and water. Clean all high-touch surfaces everyday  High touch surfaces include counters, tabletops, doorknobs, bathroom fixtures, toilets, phones, keyboards, tablets, and bedside tables. Also, clean any surfaces that may have blood, stool, or body fluids on them. Use a household cleaning spray or wipe, according to the label instructions. Labels contain instructions for safe and effective use of the cleaning product including precautions you should take when applying the product, such as wearing gloves and making sure you have good ventilation during use of the product. Monitor your symptoms  Seek prompt medical attention if your illness is worsening (e.g., difficulty breathing).  Before seeking care, call your healthcare provider and tell them that you have, or are being evaluated for, COVID-19. Put on a facemask before you enter the facility. These steps will help the healthcare providers office to keep other people in the office or waiting room from getting infected or exposed. Ask your healthcare provider to call the local or state health department. Persons who are placed under active monitoring or facilitated self-monitoring should follow instructions provided by their local health department or occupational health professionals, as appropriate. When working with your local health department check their available hours. If you have a medical emergency and need to call 911, notify the dispatch personnel that you have, or are being evaluated for COVID-19. If possible, put on a facemask before emergency medical services arrive. Discontinuing home isolation  Patients with confirmed COVID-19 should remain under home isolation precautions until the risk of secondary transmission to others is thought to be low. The decision to discontinue home isolation precautions should be made on a case-by-case basis, in consultation with healthcare providers and state and local health departments.

## 2020-10-02 LAB — SARS-COV-2, NAA: NOT DETECTED

## 2020-10-07 ENCOUNTER — HOSPITAL ENCOUNTER (OUTPATIENT)
Dept: PULMONOLOGY | Age: 55
Discharge: HOME | End: 2020-10-07
Payer: COMMERCIAL

## 2020-10-07 VITALS — OXYGEN SATURATION: 98 %

## 2020-10-07 PROCEDURE — 94726 PLETHYSMOGRAPHY LUNG VOLUMES: CPT

## 2020-10-07 PROCEDURE — 94729 DIFFUSING CAPACITY: CPT

## 2020-10-07 PROCEDURE — 94760 N-INVAS EAR/PLS OXIMETRY 1: CPT

## 2020-10-07 PROCEDURE — 6370000000 HC RX 637 (ALT 250 FOR IP): Performed by: FAMILY MEDICINE

## 2020-10-07 PROCEDURE — 94060 EVALUATION OF WHEEZING: CPT

## 2020-10-07 RX ORDER — ALBUTEROL SULFATE 90 UG/1
4 AEROSOL, METERED RESPIRATORY (INHALATION) ONCE
Status: COMPLETED | OUTPATIENT
Start: 2020-10-07 | End: 2020-10-07

## 2020-10-07 RX ADMIN — Medication 4 PUFF: at 08:27

## 2020-10-09 ENCOUNTER — TELEPHONE (OUTPATIENT)
Dept: SURGERY | Age: 55
End: 2020-10-09

## 2020-10-09 LAB
DLCO %PRED: 82 %
DLCO PRED: NORMAL
DLCO/VA %PRED: NORMAL
DLCO/VA PRED: NORMAL
DLCO/VA: NORMAL
DLCO: NORMAL
EXPIRATORY TIME-POST: NORMAL
EXPIRATORY TIME: NORMAL
FEF 25-75% %CHNG: NORMAL
FEF 25-75% %PRED-POST: NORMAL
FEF 25-75% %PRED-PRE: NORMAL
FEF 25-75% PRED: NORMAL
FEF 25-75%-POST: NORMAL
FEF 25-75%-PRE: NORMAL
FEV1 %PRED-POST: 67 %
FEV1 %PRED-PRE: 83 %
FEV1 PRED: NORMAL
FEV1-POST: NORMAL
FEV1-PRE: NORMAL
FEV1/FVC %PRED-POST: NORMAL
FEV1/FVC %PRED-PRE: NORMAL
FEV1/FVC PRED: NORMAL
FEV1/FVC-POST: 65 %
FEV1/FVC-PRE: 75 %
FVC %PRED-POST: NORMAL
FVC %PRED-PRE: NORMAL
FVC PRED: NORMAL
FVC-POST: NORMAL
FVC-PRE: NORMAL
GAW %PRED: NORMAL
GAW PRED: NORMAL
GAW: NORMAL
IC %PRED: NORMAL
IC PRED: NORMAL
IC: NORMAL
MEP: NORMAL
MIP: NORMAL
MVV %PRED-PRE: NORMAL
MVV PRED: NORMAL
MVV-PRE: NORMAL
PEF %PRED-POST: NORMAL
PEF %PRED-PRE: NORMAL
PEF PRED: NORMAL
PEF%CHNG: NORMAL
PEF-POST: NORMAL
PEF-PRE: NORMAL
RAW %PRED: NORMAL
RAW PRED: NORMAL
RAW: NORMAL
RV %PRED: NORMAL
RV PRED: NORMAL
RV: NORMAL
SVC %PRED: NORMAL
SVC PRED: NORMAL
SVC: NORMAL
TLC %PRED: 83 %
TLC PRED: NORMAL
TLC: NORMAL
VA %PRED: NORMAL
VA PRED: NORMAL
VA: NORMAL
VTG %PRED: NORMAL
VTG PRED: NORMAL
VTG: NORMAL

## 2020-10-09 ASSESSMENT — PULMONARY FUNCTION TESTS
FEV1/FVC_PRE: 75
FEV1_PERCENT_PREDICTED_POST: 67
FEV1_PERCENT_PREDICTED_PRE: 83
FEV1/FVC_POST: 65

## 2020-10-17 ENCOUNTER — HOSPITAL ENCOUNTER (OUTPATIENT)
Age: 55
Discharge: HOME | End: 2020-10-17
Payer: COMMERCIAL

## 2020-10-17 PROCEDURE — U0003 INFECTIOUS AGENT DETECTION BY NUCLEIC ACID (DNA OR RNA); SEVERE ACUTE RESPIRATORY SYNDROME CORONAVIRUS 2 (SARS-COV-2) (CORONAVIRUS DISEASE [COVID-19]), AMPLIFIED PROBE TECHNIQUE, MAKING USE OF HIGH THROUGHPUT TECHNOLOGIES AS DESCRIBED BY CMS-2020-01-R: HCPCS

## 2020-10-20 ENCOUNTER — OFFICE VISIT (OUTPATIENT)
Dept: FAMILY MEDICINE CLINIC | Age: 55
End: 2020-10-20
Payer: COMMERCIAL

## 2020-10-20 VITALS
RESPIRATION RATE: 16 BRPM | HEART RATE: 73 BPM | OXYGEN SATURATION: 96 % | DIASTOLIC BLOOD PRESSURE: 74 MMHG | HEIGHT: 74 IN | WEIGHT: 245.8 LBS | SYSTOLIC BLOOD PRESSURE: 124 MMHG | TEMPERATURE: 97.1 F | BODY MASS INDEX: 31.54 KG/M2

## 2020-10-20 PROCEDURE — 99213 OFFICE O/P EST LOW 20 MIN: CPT | Performed by: REGISTERED NURSE

## 2020-10-20 NOTE — PROGRESS NOTES
[facility]  [phone]  Fax: [phone]   Pre-operative History and Physical      DIAGNOSIS:  Umbilical hernia     PROCEDURE: Umbilical hernia repair       History Obtained From:  patient    HISTORY OF PRESENT ILLNESS:    The patient is a 54 y.o. male with significant past medical history of umbilical hernia. I am seeing this patient for preop consultation for Dr. Maureen Card. Past Medical History:   Diagnosis Date    Arthritis     Chronic back pain     GERD (gastroesophageal reflux disease)     HTN (hypertension)      Past Surgical History:   Procedure Laterality Date    BACK SURGERY      x 2    CARPAL TUNNEL RELEASE      CERVICAL DISC SURGERY      COLONOSCOPY  16    EYE SURGERY      KNEE SURGERY      LASIK Bilateral 2013    TONSILLECTOMY       Current Outpatient Medications   Medication Sig Dispense Refill    Ginkgo Biloba 40 MG TABS Take 120 mg by mouth daily      meloxicam (MOBIC) 15 MG tablet Take 1 tablet by mouth daily 90 tablet 1    lisinopril (PRINIVIL;ZESTRIL) 10 MG tablet Take 1 tablet by mouth daily 90 tablet 3    methocarbamol (ROBAXIN) 750 MG tablet Take 1 tablet by mouth 4 times daily 120 tablet 0    omeprazole (PRILOSEC) 20 MG delayed release capsule TAKE 1 CAPSULE BY MOUTH  TWICE DAILY (Patient taking differently: Take 20 mg by mouth Daily ) 180 capsule 1    ascorbic acid (VITAMIN C) 500 MG tablet Take 500 mg by mouth daily.  Potassium Gluconate 550 MG TABS Take 550 mg by mouth 2 times daily       magnesium citrate solution Take 296 mLs by mouth once for 1 dose 296 mL 0     No current facility-administered medications for this visit.           Allergies:  No known allergies  History of allergic reaction to anesthesia:  No     Social History     Tobacco Use   Smoking Status Former Smoker    Packs/day: 1.50    Years: 21.00    Pack years: 31.50    Types: Cigarettes    Last attempt to quit: 2006    Years since quittin.0   Smokeless Tobacco Never Used     The patient states he drinks 2-3 per week. Family History   Problem Relation Age of Onset    High Blood Pressure Mother     Diabetes Mother     Kidney Disease Mother     Heart Disease Mother     Parkinsonism Father     Dementia Father     Cancer Father     Other Brother         motocycle accident       REVIEW OF SYSTEMS:    CONSTITUTIONAL:  negative  EYES:  positive for  glasses  HEENT:  negative  RESPIRATORY:  negative  CARDIOVASCULAR:  positive for  chest pain which patient is currently under evaluation for and had ECG and stress test for September 15, 2020. GASTROINTESTINAL:  positive for esophageal reflux which is controlled with omeprazole  GENITOURINARY:  negative  INTEGUMENT/BREAST:  negative  HEMATOLOGIC/LYMPHATIC:  negative  ALLERGIC/IMMUNOLOGIC:  negative  ENDOCRINE:  negative  MUSCULOSKELETAL:  positive for  stiff joints, history of neck surgery and knee pain  NEUROLOGICAL:  negative    PHYSICAL EXAM:      /74   Pulse 73   Temp 97.1 °F (36.2 °C)   Resp 16   Ht 6' 2\" (1.88 m)   Wt 245 lb 12.8 oz (111.5 kg)   SpO2 96%   BMI 31.56 kg/m²     CONSTITUTIONAL:  awake, alert, cooperative, no apparent distress, and appears stated age    Eyes:  Lids and lashes normal, pupils equal, round and reactive to light, extra ocular muscles intact, sclera clear, conjunctiva normal.    Head/ENT:  Normocephalic, without obvious abnormality, atramatic, sinuses nontender on palpation, external ears without lesions, oral pharynx with moist mucus membranes, tonsils without erythema or exudates, gums normal and full dentures. Neck:  Supple, symmetrical, trachea midline, no adenopathy, thyroid symmetric, not enlarged and no tenderness, skin normal.    Heart:  Normal apical impulse, regular rate and rhythm, normal S1 and S2, no S3 or S4, and no murmur noted. Lungs:  No increased work of breathing, good air exchange, clear to auscultation bilaterally, no crackles or wheezing.     Abdomen: No scars, normal bowel sounds, soft, non-distended, non-tender, no masses palpated, no hepatosplenomegally. Umbilical hernia present. Extremities:  No clubbing, cyanosis, or edema    NEUROLOGIC:  Awake, alert, oriented to name, place and time. ASSESSMENT AND PLAN:    1. Patient is a 54 y.o. male with above specified procedure planned on 11/9/20 with Dr. Trinh Marin at [place] [address]. 2. Stop ASA/NSAIDs medications 7-10 days prior to procedure. 3.Patient is cleared for surgery  4. Preop has been faxed to Dr. Alda Hunter office    [facility], [facility] [address],  [address] [place] [address], [address] [place] [address]  [phone]

## 2020-10-21 LAB — SARS-COV-2: NOT DETECTED

## 2020-10-22 ENCOUNTER — ANESTHESIA EVENT (OUTPATIENT)
Dept: ENDOSCOPY | Age: 55
End: 2020-10-22
Payer: COMMERCIAL

## 2020-10-23 ENCOUNTER — HOSPITAL ENCOUNTER (OUTPATIENT)
Age: 55
Setting detail: OUTPATIENT SURGERY
Discharge: HOME | End: 2020-10-23
Attending: INTERNAL MEDICINE | Admitting: INTERNAL MEDICINE
Payer: COMMERCIAL

## 2020-10-23 ENCOUNTER — ANESTHESIA (OUTPATIENT)
Dept: ENDOSCOPY | Age: 55
End: 2020-10-23
Payer: COMMERCIAL

## 2020-10-23 VITALS
HEART RATE: 70 BPM | WEIGHT: 244 LBS | SYSTOLIC BLOOD PRESSURE: 103 MMHG | OXYGEN SATURATION: 98 % | HEIGHT: 75 IN | RESPIRATION RATE: 20 BRPM | DIASTOLIC BLOOD PRESSURE: 69 MMHG | BODY MASS INDEX: 30.34 KG/M2 | TEMPERATURE: 96.3 F

## 2020-10-23 VITALS
OXYGEN SATURATION: 96 % | DIASTOLIC BLOOD PRESSURE: 89 MMHG | SYSTOLIC BLOOD PRESSURE: 121 MMHG | RESPIRATION RATE: 18 BRPM

## 2020-10-23 PROCEDURE — 2709999900 HC NON-CHARGEABLE SUPPLY: Performed by: INTERNAL MEDICINE

## 2020-10-23 PROCEDURE — 3700000001 HC ADD 15 MINUTES (ANESTHESIA): Performed by: INTERNAL MEDICINE

## 2020-10-23 PROCEDURE — 7100000010 HC PHASE II RECOVERY - FIRST 15 MIN: Performed by: INTERNAL MEDICINE

## 2020-10-23 PROCEDURE — 45378 DIAGNOSTIC COLONOSCOPY: CPT | Performed by: INTERNAL MEDICINE

## 2020-10-23 PROCEDURE — 6360000002 HC RX W HCPCS: Performed by: NURSE ANESTHETIST, CERTIFIED REGISTERED

## 2020-10-23 PROCEDURE — 43235 EGD DIAGNOSTIC BRUSH WASH: CPT | Performed by: INTERNAL MEDICINE

## 2020-10-23 PROCEDURE — 2580000003 HC RX 258: Performed by: ANESTHESIOLOGY

## 2020-10-23 PROCEDURE — 3609017100 HC EGD: Performed by: INTERNAL MEDICINE

## 2020-10-23 PROCEDURE — 3700000000 HC ANESTHESIA ATTENDED CARE: Performed by: INTERNAL MEDICINE

## 2020-10-23 PROCEDURE — 3609027000 HC COLONOSCOPY: Performed by: INTERNAL MEDICINE

## 2020-10-23 PROCEDURE — 7100000011 HC PHASE II RECOVERY - ADDTL 15 MIN: Performed by: INTERNAL MEDICINE

## 2020-10-23 PROCEDURE — 2500000003 HC RX 250 WO HCPCS: Performed by: NURSE ANESTHETIST, CERTIFIED REGISTERED

## 2020-10-23 RX ORDER — SODIUM CHLORIDE 0.9 % (FLUSH) 0.9 %
10 SYRINGE (ML) INJECTION EVERY 12 HOURS SCHEDULED
Status: DISCONTINUED | OUTPATIENT
Start: 2020-10-23 | End: 2020-10-23 | Stop reason: HOSPADM

## 2020-10-23 RX ORDER — PROPOFOL 10 MG/ML
INJECTION, EMULSION INTRAVENOUS PRN
Status: DISCONTINUED | OUTPATIENT
Start: 2020-10-23 | End: 2020-10-23 | Stop reason: SDUPTHER

## 2020-10-23 RX ORDER — LIDOCAINE HYDROCHLORIDE 20 MG/ML
INJECTION, SOLUTION INFILTRATION; PERINEURAL PRN
Status: DISCONTINUED | OUTPATIENT
Start: 2020-10-23 | End: 2020-10-23 | Stop reason: SDUPTHER

## 2020-10-23 RX ORDER — SODIUM CHLORIDE, SODIUM LACTATE, POTASSIUM CHLORIDE, CALCIUM CHLORIDE 600; 310; 30; 20 MG/100ML; MG/100ML; MG/100ML; MG/100ML
INJECTION, SOLUTION INTRAVENOUS CONTINUOUS
Status: DISCONTINUED | OUTPATIENT
Start: 2020-10-23 | End: 2020-10-23 | Stop reason: HOSPADM

## 2020-10-23 RX ORDER — SODIUM CHLORIDE 0.9 % (FLUSH) 0.9 %
10 SYRINGE (ML) INJECTION PRN
Status: DISCONTINUED | OUTPATIENT
Start: 2020-10-23 | End: 2020-10-23 | Stop reason: HOSPADM

## 2020-10-23 RX ADMIN — PROPOFOL 100 MG: 10 INJECTION, EMULSION INTRAVENOUS at 10:04

## 2020-10-23 RX ADMIN — LIDOCAINE HYDROCHLORIDE 100 MG: 20 INJECTION, SOLUTION INFILTRATION; PERINEURAL at 10:04

## 2020-10-23 RX ADMIN — PROPOFOL 50 MG: 10 INJECTION, EMULSION INTRAVENOUS at 10:13

## 2020-10-23 RX ADMIN — PROPOFOL 50 MG: 10 INJECTION, EMULSION INTRAVENOUS at 10:05

## 2020-10-23 RX ADMIN — SODIUM CHLORIDE, POTASSIUM CHLORIDE, SODIUM LACTATE AND CALCIUM CHLORIDE: 600; 310; 30; 20 INJECTION, SOLUTION INTRAVENOUS at 10:01

## 2020-10-23 ASSESSMENT — PAIN SCALES - GENERAL
PAINLEVEL_OUTOF10: 0
PAINLEVEL_OUTOF10: 0

## 2020-10-23 ASSESSMENT — ENCOUNTER SYMPTOMS: SHORTNESS OF BREATH: 1

## 2020-10-23 ASSESSMENT — PAIN - FUNCTIONAL ASSESSMENT: PAIN_FUNCTIONAL_ASSESSMENT: 0-10

## 2020-10-23 NOTE — H&P
[facility],[address],  [address] [facility], [address] [place] [address]  Phone: [phone]   Fax:[phone]     CHIEF COMPLAINT           Chief Complaint   Patient presents with    New Patient       chest pain not heart related          HPI      Thank you Hermes Ramirez MD for asking me to see Candice Alyssa in consultation. He is a Single [1] White [1] 54 y.o. . male seen independently who presents with the following GI complaints:     Parker ZAPATA Surface  Complains of several weeks of chest discomfort described as mild in severity. No relationship to any specific including eating. No associated symptoms. No heartburn, dysphagia, cough, globus, regurgitation, nausea, or vomiting. Has been on prilosec for 10-12 years for initially heartburn with no breakthrough. Can not reproduce his symptom through movement or pressure in the area. Indicates has had a negative cardiac evaluation. No pertinent GI family history.      Screening colonoscopy for cancer prevention of the colon. He has had a poor to fair bowel prep on his last 2 colonoscopies and a 5 year follow up was recommended on his last.  Does not feel he has constipation.       Last Encounter Reviewed:   Pertinent PMH, FH, SH is reviewed below. Last EGD: none  Last Colonoscopy: 1/28/2016 mild diverticulosis     Review of available records reveals:       Wt Readings from Last 50 Encounters:   09/28/20 245 lb 12.8 oz (111.5 kg)   09/28/20 246 lb (111.6 kg)   09/23/20 242 lb (109.8 kg)   09/16/20 242 lb 3.2 oz (109.9 kg)   03/23/20 244 lb 14.9 oz (111.1 kg)   01/28/20 245 lb (111.1 kg)   11/13/19 249 lb (112.9 kg)   01/18/19 248 lb (112.5 kg)   07/16/18 247 lb (112 kg)   01/19/18 251 lb (113.9 kg)   12/22/17 256 lb 6.4 oz (116.3 kg)   09/09/17 245 lb (111.1 kg)   06/21/17 249 lb (112.9 kg)   05/17/17 247 lb (112 kg)   02/02/17 255 lb 15.3 oz (116.1 kg)   12/06/16 256 lb (116.1 kg)   09/27/16 251 lb (113.9 kg)   08/24/16 248 lb 9.6 oz (112.8 kg) 03/14/16 238 lb (108 kg)   02/03/16 244 lb (110.7 kg)   01/28/16 235 lb (106.6 kg)   01/14/16 243 lb (110.2 kg)   01/06/16 235 lb (106.6 kg)   12/07/15 250 lb (113.4 kg)   08/31/15 250 lb (113.4 kg)   12/09/14 251 lb 9.6 oz (114.1 kg)   08/29/14 240 lb (108.9 kg)   07/16/14 247 lb 12.8 oz (112.4 kg)   05/07/14 236 lb (107 kg)   03/17/14 240 lb (108.9 kg)         No components found for: HGBA1C      BP Readings from Last 3 Encounters:   09/28/20 128/70   09/28/20 122/74   09/23/20 116/78           Health Maintenance   Topic Date Due    Shingles Vaccine (1 of 2) 07/21/2015    A1C test (Diabetic or Prediabetic)  01/18/2020    Low dose CT lung screening  07/21/2020    Flu vaccine (1) 09/01/2020    Colon cancer screen colonoscopy  02/01/2021    Potassium monitoring  09/16/2021    Creatinine monitoring  09/16/2021    Lipid screen  09/16/2025    DTaP/Tdap/Td vaccine (2 - Td) 10/15/2027    Hepatitis C screen  Completed    HIV screen  Completed    Hepatitis A vaccine  Aged Out    Hepatitis B vaccine  Aged Out    Hib vaccine  Aged Out    Meningococcal (ACWY) vaccine  Aged Out    Pneumococcal 0-64 years Vaccine  Aged Out         No components found for: [facility]      PAST MEDICAL HISTORY      Past Medical History        Past Medical History:   Diagnosis Date    Arthritis      Chronic back pain      GERD (gastroesophageal reflux disease)      HTN (hypertension)          FAMILY HISTORY      Family History         Family History   Problem Relation Age of Onset    High Blood Pressure Mother      Diabetes Mother      Kidney Disease Mother      Heart Disease Mother      Parkinsonism Father      Dementia Father      Cancer Father      Other Brother           motocycle accident        SOCIAL HISTORY      Social History               Socioeconomic History    Marital status: Single       Spouse name: Not on file    Number of children: Not on file    Years of education: Not on file   Pratik Gillette education level: Not on file   Occupational History    Not on file   Social Needs    Financial resource strain: Not on file    Food insecurity       Worry: Not on file       Inability: Not on file    Transportation needs       Medical: Not on file       Non-medical: Not on file   Tobacco Use    Smoking status: Former Smoker       Packs/day: 1.50       Years: 21.00       Pack years: 31.50       Types: Cigarettes       Last attempt to quit: 2006       Years since quittin.0    Smokeless tobacco: Never Used   Substance and Sexual Activity    Alcohol use:  Yes       Comment: Social Drinker    Drug use: No    Sexual activity: Yes       Partners: Female   Lifestyle    Physical activity       Days per week: Not on file       Minutes per session: Not on file    Stress: Not on file   Relationships    Social connections       Talks on phone: Not on file       Gets together: Not on file       Attends Mosque service: Not on file       Active member of club or organization: Not on file       Attends meetings of clubs or organizations: Not on file       Relationship status: Not on file    Intimate partner violence       Fear of current or ex partner: Not on file       Emotionally abused: Not on file       Physically abused: Not on file       Forced sexual activity: Not on file   Other Topics Concern    Not on file   Social History Narrative    Not on file        SURGICAL HISTORY      Past Surgical History         Past Surgical History:   Procedure Laterality Date    BACK SURGERY         x 2    CARPAL TUNNEL RELEASE        [address]ERVICAL [address]        COLONOSCOPY   16    EYE SURGERY        KNEE SURGERY        LASIK Bilateral 2013    TONSILLECTOMY            CURRENT MEDICATIONS   (This list may include medications prescribed during this encounter as epic can not insert only the list prior to this encounter.)  Current Outpatient Rx          Current Outpatient Rx   Medication Sig Dispense Refill  bisacodyl (DULCOLAX) 5 MG EC tablet Take 4 tablets by mouth once for 1 dose Take as directed for colonoscopy. 4 tablet 0    Na Sulfate-K Sulfate-Mg Sulf 17.5-3.13-1.6 GM/177ML SOLN Take 1 kit by mouth once for 1 dose Insured 30% off (max $15) after first $40 of co-pay/cash price [employer] 1 Bottle 0    magnesium citrate solution Take 296 mLs by mouth once for 1 dose 296 mL 0    Ginkgo Biloba 40 MG TABS Take 120 mg by mouth daily        meloxicam (MOBIC) 15 MG tablet Take 1 tablet by mouth daily 90 tablet 1    lisinopril (PRINIVIL;ZESTRIL) 10 MG tablet Take 1 tablet by mouth daily 90 tablet 3    methocarbamol (ROBAXIN) 750 MG tablet Take 1 tablet by mouth 4 times daily 120 tablet 0    omeprazole (PRILOSEC) 20 MG delayed release capsule TAKE 1 CAPSULE BY MOUTH  TWICE DAILY (Patient taking differently: Take 20 mg by mouth Daily ) 180 capsule 1    ascorbic acid (VITAMIN C) 500 MG tablet Take 500 mg by mouth daily.        Potassium Gluconate 550 MG TABS Take 550 mg by mouth 2 times daily             ALLERGIES           Allergies   Allergen Reactions    No Known Allergies        IMMUNIZATIONS           Immunization History   Administered Date(s) Administered    Influenza Virus Vaccine 11/06/2017    Tdap (Boostrix, Adacel) 10/15/2017      REVIEW OF SYSTEMS   See HPI for further details and pertinent postiives. Negative for the following:  Constitutional: Negative for weight change. Negative for appetite change and fatigue. HENT: Negative for nosebleeds, sore throat, mouth sores, and voice change. Respiratory: Negative for cough, choking and chest tightness. Cardiovascular: Negative for chest pain   Gastrointestinal: See HPI  Musculoskeletal: Negative for arthralgias. Skin: Negative for pallor. Neurological: Negative for weakness and light-headedness. Hematological: Negative for adenopathy. Does not bruise/bleed easily.    Psychiatric/Behavioral: Negative for suicidal ideas.   PHYSICAL EXAM   VITAL SIGNS: /70 (Site: Left Lower Arm)   Pulse 69   Temp 97.7 °F (36.5 °C)   Ht 6' 2\" (1.88 m)   Wt 245 lb 12.8 oz (111.5 kg)   BMI 31.56 kg/m²       Wt Readings from Last 3 Encounters:   09/28/20 245 lb 12.8 oz (111.5 kg)   09/28/20 246 lb (111.6 kg)   09/23/20 242 lb (109.8 kg)      Constitutional: Well developed, Well nourished, No acute distress, Non-toxic appearance. HENT: Normocephalic, Atraumatic, Bilateral external ears normal, Oropharynx moist, No oral exudates, Nose normal.   Eyes: Conjunctiva normal, No discharge. Neck: Normal range of motion, No tenderness, Supple, No stridor. Lymphatic: No cervical, subclavian, or axillary lymphadenopathy. Cardiovascular: Normal heart rate, Normal rhythm, No murmurs, No rubs, No gallops. Thorax & Lungs: Normal breath sounds, No respiratory distress, No wheezing, No chest tenderness. No gynecomastia. Abdomen: scars consistent with stated surgeries, no hernias, no HSM, soft NTND   Rectal:  Deferred. Skin: Warm, Dry, No erythema, No rash. No bruising. No spider hemangiomas. Back: No tenderness, No CVA tenderness. Lower Extremities: Intact distal pulses, No edema, No tenderness, No cyanosis, No clubbing. Neurologic: Alert & oriented x 3, Normal motor function, Normal sensory function, No focal deficits noted. No asterixis.   RADIOLOGY/PROCEDURES         FINAL IMPRESSION             Orders Placed This Encounter   Procedures    COLONOSCOPY W/ OR W/O BIOPSY       Scheduling Instructions:         Schedule with anesthesia provided diprivan.                   Please provide prep of choice instructions and prescription.                     General guidelines for holding blood thinners/anticoagulants around endoscopic procedure are but patients are encouraged to check with their prescribing physician.                   The patient may hold Plavix, Effient, Brilinta 5 days prior to the procedure unless:          A drug eluting enteroscopy, EUS and FNA, tumor ablation by any technique,          cystogastrostomy,and treatment of varices.       Order Specific Question:   Screening or Diagnostic?       Answer:   Diagnostic    Covid-19 Ambulatory       Standing Status:   Future       Standing Expiration Date:   9/28/2021       Scheduling Instructions:         Saline media preferred given current shortage of viral transport media but both acceptable       Order Specific Question:   Status       Answer:   Asymptomatic/Surveillance (e.g. pre-op/pre-procedure, pre-delivery, transfer)       Order Specific Question:   Reason for Test       Answer:   Upcoming elective surgery/procedure/delivery, return to work, or discharge to another facility    EGD       Scheduling Instructions:         Schedule with anesthesia provided diprivan sedation.                   Please provide prep of choice instructions and prescription.                     General guidelines for holding blood thinners/anticoagulants around endoscopic procedure are but patients are encouraged to check with their prescribing physician.                   The patient may hold Plavix, Effient, Brilinta 5 days prior to the procedure unless:          A drug eluting stent has been placed within past 12 months.         A nondrug eluting stent has been placed within past 1 month.         Coumadin may be held 4 days prior to the procedure unless:           Mechanical mitral valve replacement (requires heparin bridge while Coumadin held and is managed by pharmacy)         Pradaxa, Xarelto, Eliquis may be held 2-3 days prior to procedure.   According to pharmacokinetics of the drug, package insert, cardiology practice patterns, and T1/2 of theses drugs (12 hrs), Eliquis and Xarelto are held 48hrs prior to any procedure, including major surgical procedures w/o          increased bleeding.  That is usually the standard of care, as coagulation would/should be normalized at 48hrs.         Every attempt should be made to maintain ASA 81mg per day throughout the lukas-operative period in patients with diagnosis of ASHD.       These recommendations may need to be modified by the provider/ based on risk /benefit analysis of the procedure and the patients history.                   If anticoagulation can not be held because recent cardiac stent, elective endoscopic procedures should be delayed until they have received the minimum duration of recommended antiplatlet therapy and it can safely be held. Again if unsure, patient should discuss with prescribing physician/service.                   If anticoagulation can not be stopped, endoscopic procedures can still be performed either diagnostically at a somewhat higher risk. Understand that any therapeutic procedure where anything beyond looking is performed, carries higher risks. For this reason without overt bleeding other testing          such as cologuard may be more appropriate.                     High risk endoscopic procedures that require stopping antiplatelet and anticoagulation therapy include polypectomy, biliary or pancreatic sphincterotomy, pneumatic or bougie dilation, PEG placement, therapeutic balloon-assisted enteroscopy, EUS and FNA, tumor ablation by any technique,          cystogastrostomy,and treatment of varices.       Order Specific Question:   Screening or Diagnostic?       Answer:   Diagnostic      Marian Olmstead was seen today for new patient.     Diagnoses and all orders for this visit:     Chest pain, unspecified type  -     EGD     Preop testing  -     Covid-19 Ambulatory; Future     Screening for colon cancer  -     COLONOSCOPY W/ OR W/O BIOPSY  -     bisacodyl (DULCOLAX) 5 MG EC tablet; Take 4 tablets by mouth once for 1 dose Take as directed for colonoscopy.   -     Na Sulfate-K Sulfate-Mg Sulf 17.5-3.13-1.6 GM/177ML SOLN; Take 1 kit by mouth once for 1 dose Insured 30% off (max $15) after first $40 of co-pay/cash price [email or web address]  -     magnesium citrate solution; Take 296 mLs by mouth once for 1 dose           ORDERED FUTURE/PENDING TESTS      Lab Frequency Next Occurrence   Full PFT Study With Bronchodilator Once 09/23/2020   Initiate PAT Protocol Once 10/12/2020   COVID-19 Ambulatory Once 09/28/2020         FOLLOWUP   Return for EGD & Colonoscopy. The patient was counseled at length about the risks of juancho Covid-19 during their perioperative period and any recovery window from their procedure. The patient was made aware that juancho Covid-19  may worsen their prognosis for recovering from their procedure  and lend to a higher morbidity and/or mortality risk. All material risks, benefits, and reasonable alternatives including postponing the procedure were discussed. The patient does wish to proceed with the procedure at this time. Preprocedural COVID-19 throat swab was negative.       Jaime Nicholas 10/23/20 9:55 AM EDT

## 2020-10-23 NOTE — OP NOTE
[address] ,  [address] [facility]  Phone: [phone] [facility],  [address] [facility], [address] [place] [address]  Phone: [phone]   [phone]    EGD & Colonoscopy Procedure Note    Patient: Minor Macdonald  : 1965    Procedure: Esophagogastroduodenoscopy and Colonoscopy    Date:  10/23/2020     Endoscopist:  Jay Toledo MD    Referring Physician:  Arsen Espana MD    Preoperative Diagnosis:  Chest pain, unspecified type [R07.9]  Special screening for malignant neoplasms, colon [Z12.11]    Postoperative Diagnosis:  See impression    Anesthesia: Anesthesia: MAC  ASA Class: 2  Mallampati: I (soft palate, uvula, fauces, tonsillar pillars visible)    Indications: This is a 54y.o. year old male who presents today with heartburn, chest discomfort, screening colon. Procedure Details  Informed consent was obtained for the procedure, including sedation. Risks of perforation, hemorrhage, adverse drug reaction and aspiration were discussed. The patient was placed in the left lateral decubitus position. Based on the pre-procedure assessment, including review of the patient's medical history, medications, allergies, and review of systems, he had been deemed to be an appropriate candidate for conscious sedation; he was therefore sedated with the medications listed below. The patient was monitored continuously with ECG tracing, pulse oximetry, blood pressure monitoring, and direct observations. A gastroscope was inserted into the mouth and advanced under direct vision to second portion of the duodenum. A careful inspection was made as the gastroscope was withdrawn, including a retroflexed view of the proximal stomach including views of the incisura and cardia; findings and interventions are described below. Rectal examination was performed. There were no external hemorrhoids, fissures or skin tags.   The colonoscope was inserted

## 2020-10-23 NOTE — ANESTHESIA POSTPROCEDURE EVALUATION
Department of Anesthesiology  Postprocedure Note    Patient: Mo Neil  MRN: [id number]  YOB: 1965  Date of evaluation: 10/23/2020  Time:  2:06 PM     Procedure Summary     Date:  10/23/20 Room / Location:  [place] [address] / [facility]    Anesthesia Start:  1001 Anesthesia Stop:  1020    Procedures:       EGD W/ANES. (10:30) (N/A )      COLON W/ANES. (N/A ) Diagnosis:       Chest pain, unspecified type      Special screening for malignant neoplasms, colon      (CHEST PAIN, SCREENING)    Surgeon:  Cameron Hook MD Responsible Provider:  Spike Perez MD    Anesthesia Type:  MAC ASA Status:  3          Anesthesia Type: MAC    Chris Phase I: Chris Score: 10    Chris Phase II: Chris Score: 10    Last vitals: Reviewed and per EMR flowsheets.        Anesthesia Post Evaluation    Patient location during evaluation: PACU  Patient participation: complete - patient participated  Level of consciousness: awake and alert  Pain score: 0  Airway patency: patent  Nausea & Vomiting: no nausea and no vomiting  Complications: no  Cardiovascular status: blood pressure returned to baseline  Respiratory status: acceptable  Hydration status: stable

## 2020-10-23 NOTE — PROGRESS NOTES
Assessment unchaged. Denies pain. Instructions reviewed with family via phone. Verbalizes understanding. Patient discharged to home with family via wheelchair.

## 2020-10-23 NOTE — ANESTHESIA PRE PROCEDURE
Department of Anesthesiology  Preprocedure Note       Name:  Lou Pollock   Age:  54 y.o.  :  1965                                          MRN:  [id number]         Date:  10/23/2020      Surgeon: Montserrat Macias):  Maksim Barton MD    Procedure: Procedure(s):  EGD W/ANES. (10:30)  COLON W/ANES. Medications prior to admission:   Prior to Admission medications    Medication Sig Start Date End Date Taking? Authorizing Provider   Ginkgo Biloba 40 MG TABS Take 120 mg by mouth daily   Yes Historical Provider, MD   meloxicam (MOBIC) 15 MG tablet Take 1 tablet by mouth daily 20  Yes Tata Mauricio APRN - CNP   lisinopril (PRINIVIL;ZESTRIL) 10 MG tablet Take 1 tablet by mouth daily 20  Yes JALEN Bowen - CNP   methocarbamol (ROBAXIN) 750 MG tablet Take 1 tablet by mouth 4 times daily 19  Yes Verona Ceballos MD   omeprazole (PRILOSEC) 20 MG delayed release capsule TAKE 1 CAPSULE BY MOUTH  TWICE DAILY  Patient taking differently: Take 20 mg by mouth Daily  10/25/19  Yes Verona Ceballos MD   ascorbic acid (VITAMIN C) 500 MG tablet Take 500 mg by mouth daily. Yes Historical Provider, MD   Potassium Gluconate 550 MG TABS Take 550 mg by mouth 2 times daily    Yes Historical Provider, MD       Current medications:    Current Facility-Administered Medications   Medication Dose Route Frequency Provider Last Rate Last Dose    lactated ringers infusion   Intravenous Continuous Reema Garcia MD        sodium chloride flush 0.9 % injection 10 mL  10 mL Intravenous 2 times per day Reema Garcia MD        sodium chloride flush 0.9 % injection 10 mL  10 mL Intravenous PRN Reema Garcia MD           Allergies:     Allergies   Allergen Reactions    No Known Allergies        Problem List:    Patient Active Problem List   Diagnosis Code    Chest pain R07.9    GERD (gastroesophageal reflux disease) K21.9    Obesity E66.9    Lateral epicondylitis M77.10    Shoulder impingement M75.40    NOLVIA on CPAP G47.33, Z99.89    Right knee pain Z68.572    Plica syndrome, 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    Exertional dyspnea R06.00       Past Medical History:        Diagnosis Date    Arthritis     Chronic back pain     GERD (gastroesophageal reflux disease)     HTN (hypertension)        Past Surgical History:        Procedure Laterality Date    BACK SURGERY      x 2    CARPAL TUNNEL RELEASE      CERVICAL DISC SURGERY      COLONOSCOPY  16    EYE SURGERY      KNEE SURGERY      LASIK Bilateral 2013    TONSILLECTOMY         Social History:    Social History     Tobacco Use    Smoking status: Former Smoker     Packs/day: 1.50     Years: 21.00     Pack years: 31.50     Types: Cigarettes     Last attempt to quit: 2006     Years since quittin.0    Smokeless tobacco: Never Used   Substance Use Topics    Alcohol use: Yes     Comment: Social Drinker                                Counseling given: Not Answered      Vital Signs (Current):   Vitals:    10/23/20 0947   BP: 116/72   Pulse: 77   Resp: 16   Temp: 97.2 °F (36.2 °C)   TempSrc: Temporal   SpO2: 97%   Weight: 244 lb (110.7 kg)   Height: 6' 3\" (1.905 m)                                              BP Readings from Last 3 Encounters:   10/23/20 116/72   10/20/20 124/74   20 128/70       NPO Status: Time of last liquid consumption: 630                        Time of last solid consumption:                         Date of last liquid consumption: 10/23/20                        Date of last solid food consumption: 10/21/20    BMI:   Wt Readings from Last 3 Encounters:   10/23/20 244 lb (110.7 kg)   10/20/20 245 lb 12.8 oz (111.5 kg)   20 245 lb 12.8 oz (111.5 kg)     Body mass index is 30.5 kg/m².     CBC:   Lab Results   Component Value Date    WBC 8.0 2020    RBC 4.73 2020    HGB 14.2 2020    HCT 42.4 2020    MCV 89.5 2020    RDW 14.0 2020     2020       CMP: Lab Results   Component Value Date     09/16/2020    K 4.1 09/16/2020     09/16/2020    CO2 26 09/16/2020    BUN 13 09/16/2020    CREATININE 0.9 09/16/2020    GFRAA >60 09/16/2020    AGRATIO 1.7 09/15/2020    LABGLOM >60 09/16/2020    GLUCOSE 115 09/16/2020    PROT 7.0 09/15/2020    CALCIUM 9.3 09/16/2020    BILITOT 0.3 09/15/2020    ALKPHOS 81 09/15/2020    AST 19 09/15/2020    ALT 25 09/15/2020       POC Tests: No results for input(s): POCGLU, POCNA, POCK, POCCL, POCBUN, POCHEMO, POCHCT in the last 72 hours. Coags:   Lab Results   Component Value Date    PROTIME 10.5 02/16/2015    INR 0.97 02/16/2015    APTT 30.5 07/15/2014       HCG (If Applicable): No results found for: PREGTESTUR, PREGSERUM, HCG, HCGQUANT     ABGs: No results found for: PHART, PO2ART, RSV2YQP, PWK0ZGL, BEART, G7RWFTJM     Type & Screen (If Applicable):  Lab Results   Component Value Date    LABABO O%POS^^POSITIVE 07/15/2014       Drug/Infectious Status (If Applicable):  No results found for: HIV, HEPCAB    COVID-19 Screening (If Applicable):   Lab Results   Component Value Date    COVID19 Not Detected 10/17/2020    COVID19 NOT DETECTED 10/01/2020         Anesthesia Evaluation  Patient summary reviewed and Nursing notes reviewed  Airway: Mallampati: II  TM distance: >3 FB   Neck ROM: full  Mouth opening: > = 3 FB Dental:    (+) upper dentures and lower dentures      Pulmonary:normal exam  breath sounds clear to auscultation  (+) shortness of breath:  sleep apnea:                             Cardiovascular:    (+) hypertension:,         Rhythm: regular  Rate: normal                    Neuro/Psych:   Negative Neuro/Psych ROS              GI/Hepatic/Renal:   (+) GERD:,           Endo/Other:    (+) : arthritis: OA., .                 Abdominal:   (+) obese,         Vascular: negative vascular ROS. Anesthesia Plan      MAC     ASA 3       Induction: intravenous.       Anesthetic plan and risks discussed with patient. Plan discussed with CRNA.                   Jeanna Florez MD   10/23/2020

## 2020-11-03 ENCOUNTER — OFFICE VISIT (OUTPATIENT)
Dept: PRIMARY CARE CLINIC | Age: 55
End: 2020-11-03
Payer: COMMERCIAL

## 2020-11-03 PROCEDURE — 99211 OFF/OP EST MAY X REQ PHY/QHP: CPT | Performed by: NURSE PRACTITIONER

## 2020-11-03 NOTE — PATIENT INSTRUCTIONS

## 2020-11-04 LAB — SARS-COV-2, NAA: NOT DETECTED

## 2020-11-05 NOTE — RESULT ENCOUNTER NOTE

## 2020-11-09 ENCOUNTER — HOSPITAL ENCOUNTER (OUTPATIENT)
Age: 55
Setting detail: OUTPATIENT SURGERY
Discharge: HOME | End: 2020-11-09
Attending: SURGERY | Admitting: SURGERY
Payer: COMMERCIAL

## 2020-11-09 ENCOUNTER — ANESTHESIA (OUTPATIENT)
Dept: OPERATING ROOM | Age: 55
End: 2020-11-09
Payer: COMMERCIAL

## 2020-11-09 ENCOUNTER — ANESTHESIA EVENT (OUTPATIENT)
Dept: OPERATING ROOM | Age: 55
End: 2020-11-09
Payer: COMMERCIAL

## 2020-11-09 VITALS
DIASTOLIC BLOOD PRESSURE: 72 MMHG | HEART RATE: 64 BPM | RESPIRATION RATE: 16 BRPM | OXYGEN SATURATION: 97 % | SYSTOLIC BLOOD PRESSURE: 110 MMHG | BODY MASS INDEX: 30.46 KG/M2 | TEMPERATURE: 97 F | HEIGHT: 75 IN | WEIGHT: 245 LBS

## 2020-11-09 VITALS
SYSTOLIC BLOOD PRESSURE: 111 MMHG | DIASTOLIC BLOOD PRESSURE: 63 MMHG | OXYGEN SATURATION: 91 % | RESPIRATION RATE: 2 BRPM

## 2020-11-09 PROCEDURE — 3700000000 HC ANESTHESIA ATTENDED CARE: Performed by: SURGERY

## 2020-11-09 PROCEDURE — 2500000003 HC RX 250 WO HCPCS: Performed by: SURGERY

## 2020-11-09 PROCEDURE — 49585 REPAIR UMBILICAL HERN,5+Y/O,REDUC: CPT | Performed by: SURGERY

## 2020-11-09 PROCEDURE — 3700000001 HC ADD 15 MINUTES (ANESTHESIA): Performed by: SURGERY

## 2020-11-09 PROCEDURE — 2709999900 HC NON-CHARGEABLE SUPPLY: Performed by: SURGERY

## 2020-11-09 PROCEDURE — 2580000003 HC RX 258: Performed by: ANESTHESIOLOGY

## 2020-11-09 PROCEDURE — 3600000013 HC SURGERY LEVEL 3 ADDTL 15MIN: Performed by: SURGERY

## 2020-11-09 PROCEDURE — C1781 MESH (IMPLANTABLE): HCPCS | Performed by: SURGERY

## 2020-11-09 PROCEDURE — 6360000002 HC RX W HCPCS: Performed by: NURSE ANESTHETIST, CERTIFIED REGISTERED

## 2020-11-09 PROCEDURE — 7100000010 HC PHASE II RECOVERY - FIRST 15 MIN: Performed by: SURGERY

## 2020-11-09 PROCEDURE — 3600000003 HC SURGERY LEVEL 3 BASE: Performed by: SURGERY

## 2020-11-09 PROCEDURE — 7100000011 HC PHASE II RECOVERY - ADDTL 15 MIN: Performed by: SURGERY

## 2020-11-09 PROCEDURE — 7100000000 HC PACU RECOVERY - FIRST 15 MIN: Performed by: SURGERY

## 2020-11-09 PROCEDURE — 7100000001 HC PACU RECOVERY - ADDTL 15 MIN: Performed by: SURGERY

## 2020-11-09 PROCEDURE — 2580000003 HC RX 258: Performed by: SURGERY

## 2020-11-09 DEVICE — IMPLANTABLE DEVICE: Type: IMPLANTABLE DEVICE | Site: UMBILICAL | Status: FUNCTIONAL

## 2020-11-09 RX ORDER — FENTANYL CITRATE 50 UG/ML
INJECTION, SOLUTION INTRAMUSCULAR; INTRAVENOUS PRN
Status: DISCONTINUED | OUTPATIENT
Start: 2020-11-09 | End: 2020-11-09 | Stop reason: SDUPTHER

## 2020-11-09 RX ORDER — SODIUM CHLORIDE 0.9 % (FLUSH) 0.9 %
10 SYRINGE (ML) INJECTION PRN
Status: DISCONTINUED | OUTPATIENT
Start: 2020-11-09 | End: 2020-11-09 | Stop reason: HOSPADM

## 2020-11-09 RX ORDER — MAGNESIUM HYDROXIDE 1200 MG/15ML
LIQUID ORAL CONTINUOUS PRN
Status: COMPLETED | OUTPATIENT
Start: 2020-11-09 | End: 2020-11-09

## 2020-11-09 RX ORDER — MIDAZOLAM HYDROCHLORIDE 1 MG/ML
INJECTION INTRAMUSCULAR; INTRAVENOUS PRN
Status: DISCONTINUED | OUTPATIENT
Start: 2020-11-09 | End: 2020-11-09 | Stop reason: SDUPTHER

## 2020-11-09 RX ORDER — PROPOFOL 10 MG/ML
INJECTION, EMULSION INTRAVENOUS PRN
Status: DISCONTINUED | OUTPATIENT
Start: 2020-11-09 | End: 2020-11-09 | Stop reason: SDUPTHER

## 2020-11-09 RX ORDER — SODIUM CHLORIDE 0.9 % (FLUSH) 0.9 %
10 SYRINGE (ML) INJECTION EVERY 12 HOURS SCHEDULED
Status: DISCONTINUED | OUTPATIENT
Start: 2020-11-09 | End: 2020-11-09 | Stop reason: HOSPADM

## 2020-11-09 RX ORDER — HYDRALAZINE HYDROCHLORIDE 20 MG/ML
5 INJECTION INTRAMUSCULAR; INTRAVENOUS EVERY 10 MIN PRN
Status: DISCONTINUED | OUTPATIENT
Start: 2020-11-09 | End: 2020-11-09 | Stop reason: HOSPADM

## 2020-11-09 RX ORDER — PROMETHAZINE HYDROCHLORIDE 25 MG/ML
6.25 INJECTION, SOLUTION INTRAMUSCULAR; INTRAVENOUS
Status: DISCONTINUED | OUTPATIENT
Start: 2020-11-09 | End: 2020-11-09 | Stop reason: HOSPADM

## 2020-11-09 RX ORDER — LIDOCAINE HYDROCHLORIDE 10 MG/ML
2 INJECTION, SOLUTION INFILTRATION; PERINEURAL
Status: DISCONTINUED | OUTPATIENT
Start: 2020-11-09 | End: 2020-11-09 | Stop reason: HOSPADM

## 2020-11-09 RX ORDER — LABETALOL HYDROCHLORIDE 5 MG/ML
5 INJECTION, SOLUTION INTRAVENOUS EVERY 10 MIN PRN
Status: DISCONTINUED | OUTPATIENT
Start: 2020-11-09 | End: 2020-11-09 | Stop reason: HOSPADM

## 2020-11-09 RX ORDER — ONDANSETRON 2 MG/ML
4 INJECTION INTRAMUSCULAR; INTRAVENOUS PRN
Status: DISCONTINUED | OUTPATIENT
Start: 2020-11-09 | End: 2020-11-09 | Stop reason: HOSPADM

## 2020-11-09 RX ORDER — MEPERIDINE HYDROCHLORIDE 50 MG/ML
12.5 INJECTION INTRAMUSCULAR; INTRAVENOUS; SUBCUTANEOUS EVERY 5 MIN PRN
Status: DISCONTINUED | OUTPATIENT
Start: 2020-11-09 | End: 2020-11-09 | Stop reason: HOSPADM

## 2020-11-09 RX ORDER — MORPHINE SULFATE 2 MG/ML
1 INJECTION, SOLUTION INTRAMUSCULAR; INTRAVENOUS EVERY 5 MIN PRN
Status: DISCONTINUED | OUTPATIENT
Start: 2020-11-09 | End: 2020-11-09 | Stop reason: HOSPADM

## 2020-11-09 RX ORDER — DIPHENHYDRAMINE HYDROCHLORIDE 50 MG/ML
12.5 INJECTION INTRAMUSCULAR; INTRAVENOUS
Status: DISCONTINUED | OUTPATIENT
Start: 2020-11-09 | End: 2020-11-09 | Stop reason: HOSPADM

## 2020-11-09 RX ORDER — OXYCODONE HYDROCHLORIDE AND ACETAMINOPHEN 5; 325 MG/1; MG/1
2 TABLET ORAL PRN
Status: DISCONTINUED | OUTPATIENT
Start: 2020-11-09 | End: 2020-11-09 | Stop reason: HOSPADM

## 2020-11-09 RX ORDER — MORPHINE SULFATE 2 MG/ML
2 INJECTION, SOLUTION INTRAMUSCULAR; INTRAVENOUS EVERY 5 MIN PRN
Status: DISCONTINUED | OUTPATIENT
Start: 2020-11-09 | End: 2020-11-09 | Stop reason: HOSPADM

## 2020-11-09 RX ORDER — OXYCODONE HYDROCHLORIDE AND ACETAMINOPHEN 5; 325 MG/1; MG/1
1 TABLET ORAL EVERY 6 HOURS PRN
Qty: 20 TABLET | Refills: 0 | Status: SHIPPED | OUTPATIENT
Start: 2020-11-09 | End: 2020-11-09 | Stop reason: HOSPADM

## 2020-11-09 RX ORDER — OXYCODONE HYDROCHLORIDE AND ACETAMINOPHEN 5; 325 MG/1; MG/1
1 TABLET ORAL PRN
Status: DISCONTINUED | OUTPATIENT
Start: 2020-11-09 | End: 2020-11-09 | Stop reason: HOSPADM

## 2020-11-09 RX ORDER — SODIUM CHLORIDE, SODIUM LACTATE, POTASSIUM CHLORIDE, CALCIUM CHLORIDE 600; 310; 30; 20 MG/100ML; MG/100ML; MG/100ML; MG/100ML
INJECTION, SOLUTION INTRAVENOUS CONTINUOUS
Status: DISCONTINUED | OUTPATIENT
Start: 2020-11-09 | End: 2020-11-09 | Stop reason: HOSPADM

## 2020-11-09 RX ADMIN — MIDAZOLAM HYDROCHLORIDE 4 MG: 2 INJECTION, SOLUTION INTRAMUSCULAR; INTRAVENOUS at 12:03

## 2020-11-09 RX ADMIN — PROPOFOL 40 MG: 10 INJECTION, EMULSION INTRAVENOUS at 12:16

## 2020-11-09 RX ADMIN — PROPOFOL 20 MG: 10 INJECTION, EMULSION INTRAVENOUS at 12:24

## 2020-11-09 RX ADMIN — SODIUM CHLORIDE, POTASSIUM CHLORIDE, SODIUM LACTATE AND CALCIUM CHLORIDE: 600; 310; 30; 20 INJECTION, SOLUTION INTRAVENOUS at 10:27

## 2020-11-09 RX ADMIN — FENTANYL CITRATE 100 MCG: 50 INJECTION INTRAMUSCULAR; INTRAVENOUS at 12:04

## 2020-11-09 RX ADMIN — PROPOFOL 10 MG: 10 INJECTION, EMULSION INTRAVENOUS at 12:21

## 2020-11-09 ASSESSMENT — PULMONARY FUNCTION TESTS
PIF_VALUE: 1
PIF_VALUE: 1
PIF_VALUE: 11
PIF_VALUE: 2
PIF_VALUE: 1
PIF_VALUE: 0
PIF_VALUE: 1
PIF_VALUE: 4
PIF_VALUE: 1
PIF_VALUE: 2
PIF_VALUE: 1
PIF_VALUE: 2
PIF_VALUE: 1
PIF_VALUE: 9
PIF_VALUE: 4
PIF_VALUE: 3
PIF_VALUE: 1
PIF_VALUE: 2

## 2020-11-09 ASSESSMENT — PAIN SCALES - GENERAL
PAINLEVEL_OUTOF10: 0

## 2020-11-09 ASSESSMENT — PAIN - FUNCTIONAL ASSESSMENT: PAIN_FUNCTIONAL_ASSESSMENT: 0-10

## 2020-11-09 NOTE — BRIEF OP NOTE
Brief Postoperative Note      Patient: Adolfo Mina  YOB: 1965  MRN: [id number]    Date of Procedure: 11/9/2020    Pre-Op Diagnosis: UMBILICAL HERNIA    Post-Op Diagnosis: Same       Procedure(s): UMBILICAL HERNIA REPAIR WITH MESH    Surgeon(s):Caryn Dickinson MD    Assistant:  Surgical Assistant: Melody Jim    Anesthesia: Monitor Anesthesia Care    Estimated Blood Loss (mL): less than 50     Complications: None    Specimens:   * No specimens in log *    Implants:  Implant Name Type Inv.  Item Serial No.  Lot No. LRB No. Used Action   PATCH AMY SM DIA1.7IN CIR W/ STRP [employer] ABSRB  PATCH AMY SM DIA1.7IN CIR W/ STRP [employer] ABSRB  BARD [facility] [id number] N/A 1 Implanted         Drains: * No LDAs found *    Findings: as above    Electronically signed by Raul Cancino MD on 11/9/2020 at 12:29 PM

## 2020-11-09 NOTE — ANESTHESIA POSTPROCEDURE EVALUATION
Department of Anesthesiology  Postprocedure Note    Patient: Frida Ordoñez  MRN: [id number]  YOB: 1965  Date of evaluation: 11/9/2020  Time:  1:19 PM     Procedure Summary     Date:  11/09/20 Room / Location:  [address]    Anesthesia Start:  0692 Anesthesia Stop:  1234    Procedure:  UMBILICAL HERNIA REPAIR WITH MESH -SLEEP APNEA- (N/A Abdomen) Diagnosis:       Umbilical hernia with obstruction      (UMBILICAL HERNIA)    Surgeon:  Melvi Rueda MD Responsible Provider:  Laura Shukla MD    Anesthesia Type:  MAC ASA Status:  2          Anesthesia Type: MAC    Chris Phase I: Chris Score: 9    Chris Phase II: Chris Score: 10    Last vitals: Reviewed and per EMR flowsheets.        Anesthesia Post Evaluation    Comments: Postoperative Anesthesia Note    Name:    Frida Ordoñez  MRN:      [id number]    Patient Vitals in the past 12 hrs:  11/09/20 1315, BP:110/72, Pulse:64, Resp:16, SpO2:97 %  11/09/20 1308, BP:118/78, Pulse:60, Resp:15, SpO2:95 %  11/09/20 1256, BP:120/69, Pulse:65, Resp:10  11/09/20 1240, BP:112/66, Pulse:63, Resp:10, SpO2:94 %  11/09/20 1236, BP:104/61, Temp:97 °F (36.1 °C), Pulse:69, Resp:12, SpO2:94 %  11/09/20 1024, BP:117/73, Temp:96.8 °F (36 °C), Temp src:Temporal, Pulse:67, Resp:18, SpO2:94 %, Height:6' 3\" (1.905 m), Weight:245 lb (111.1 kg)     LABS:    CBC  Lab Results       Component                Value               Date/Time                  WBC                      8.0                 09/16/2020 04:38 AM        HGB                      14.2                09/16/2020 04:38 AM        HCT                      42.4                09/16/2020 04:38 AM        PLT                      229                 09/16/2020 04:38 AM   RENAL  Lab Results       Component                Value               Date/Time                  NA                       136                 09/16/2020 04:38 AM        K                        4.1 09/16/2020 04:38 AM        CL                       102                 09/16/2020 04:38 AM        CO2                      26                  09/16/2020 04:38 AM        BUN                      13                  09/16/2020 04:38 AM        CREATININE               0.9                 09/16/2020 04:38 AM        GLUCOSE                  115 (H)             09/16/2020 04:38 AM   COAGS  Lab Results       Component                Value               Date/Time                  PROTIME                  10.5                02/16/2015 11:04 AM        INR                      0.97                02/16/2015 11:04 AM        APTT                     30.5                07/15/2014 11:00 AM     Intake & Output:  @01JNDE@    Nausea & Vomiting:  No    Level of Consciousness:  Awake    Pain Assessment:  Adequate analgesia    Anesthesia Complications:  No apparent anesthetic complications    SUMMARY      Vital signs stable  OK to discharge from Stage I post anesthesia care.   Care transferred from Anesthesiology department on discharge from perioperative area

## 2020-11-09 NOTE — H&P
[address], MD     [address], [address] [place] [address]  [place], [facility]  [phone]     Yuval Walker   YOB: 1965     Date of Visit:  11/9/2020     Pierre Almonte MD     Chief Complaint: Umbilical pain     HPI: Patient presents for evaluation of pain and a bulge in his umbilicus. He states he has had this for a few years, but it has been worse over the course of the past few months. He has had increasing pain especially with straining. The pain is a sharp stabbing pain. It goes away when he relaxes. The bulge in his umbilicus always goes back in.   He denies nausea or vomiting          Allergies   Allergen Reactions    No Known Allergies        Active Medications          Outpatient Medications Marked as Taking for the 9/28/20 encounter (Office Visit) with Pema Pierre MD   Medication Sig Dispense Refill    Ginkgo Biloba 40 MG TABS Take 120 mg by mouth daily        meloxicam (MOBIC) 15 MG tablet Take 1 tablet by mouth daily 90 tablet 1    lisinopril (PRINIVIL;ZESTRIL) 10 MG tablet Take 1 tablet by mouth daily 90 tablet 3    methocarbamol (ROBAXIN) 750 MG tablet Take 1 tablet by mouth 4 times daily 120 tablet 0    omeprazole (PRILOSEC) 20 MG delayed release capsule TAKE 1 CAPSULE BY MOUTH  TWICE DAILY (Patient taking differently: Take 20 mg by mouth Daily ) 180 capsule 1    ascorbic acid (VITAMIN C) 500 MG tablet Take 500 mg by mouth daily.        Potassium Gluconate 550 MG TABS Take 550 mg by mouth 2 times daily                Past Medical History        Past Medical History:   Diagnosis Date    Arthritis      Chronic back pain      GERD (gastroesophageal reflux disease)      HTN (hypertension)          Past Surgical History         Past Surgical History:   Procedure Laterality Date    BACK SURGERY         x 2    CARPAL TUNNEL RELEASE        CERVICAL DISC SURGERY        COLONOSCOPY   1/28/16    EYE SURGERY        KNEE SURGERY        LASIK Bilateral 2013    TONSILLECTOMY            Family History         Family History   Problem Relation Age of Onset    High Blood Pressure Mother      Diabetes Mother      Kidney Disease Mother      Heart Disease Mother      Parkinsonism Father      Dementia Father      Cancer Father      Other Brother           motocycle accident        Social History               Socioeconomic History    Marital status: Single       Spouse name: Not on file    Number of children: Not on file    Years of education: Not on file    Highest education level: Not on file   Occupational History    Not on file   Social Needs    Financial resource strain: Not on file    Food insecurity       Worry: Not on file       Inability: Not on file    Transportation needs       Medical: Not on file       Non-medical: Not on file   Tobacco Use    Smoking status: Former Smoker       Packs/day: 1.50       Years: 21.00       Pack years: 31.50       Types: Cigarettes       Last attempt to quit: 2006       Years since quittin.0    Smokeless tobacco: Never Used   Substance and Sexual Activity    Alcohol use:  Yes       Comment: Social Drinker    Drug use: No    Sexual activity: Yes       Partners: Female   Lifestyle    Physical activity       Days per week: Not on file       Minutes per session: Not on file    Stress: Not on file   Relationships    Social connections       Talks on phone: Not on file       Gets together: Not on file       Attends Voodoo service: Not on file       Active member of club or organization: Not on file       Attends meetings of clubs or organizations: Not on file       Relationship status: Not on file    Intimate partner violence       Fear of current or ex partner: Not on file       Emotionally abused: Not on file       Physically abused: Not on file       Forced sexual activity: Not on file   Other Topics Concern    Not on file   Social History Narrative    Not on file           /73   Pulse 67   Temp 96.8 °F (36 °C) (Temporal)   Resp 18   Ht 6' 3\" (1.905 m)   Wt 245 lb (111.1 kg)   SpO2 94%   BMI 30.62 kg/m²        ROS:  As per HPI, otherwise reviewed ×10 systems and negative     PE:  Constitutional:  Well developed, well nourished, no acute distress, non-toxic appearance   Eyes:  PERRL, conjunctiva normal   HENT:  Atraumatic, external ears normal, nose normal. Neck- normal range of motion, no tenderness, supple   Respiratory:  No respiratory distress, normal breath sounds, no rales, no wheezing   Cardiovascular:  Normal rate, normal rhythm  GI: Bowel sounds positive, soft, mildly tender around the umbilicus where there is a small reducible bulge and a small underlying fascial defect  :  No costovertebral angle tenderness   Integument:  Well hydrated, no rash   Lymphatic:  No lymphadenopathy noted   Neurologic:  Alert & oriented x 3, no focal deficits noted   Psychiatric:  Speech and behavior appropriate         DATA:  Radiology Review: CT images from 2017 reviewed and do show an umbilical hernia containing fat        Assessment:  1. Umbilical hernia without obstruction and without gangrene          Plan: Umbilical hernia repair with mesh. I explained the procedure including risks, benefits, and alternatives. Questions were answered and the patient agrees to proceed.

## 2020-11-09 NOTE — OP NOTE
[address] [address]                                OPERATIVE REPORT    PATIENT NAME: SOPHIE FORDE                    :        1965  MED REC NO:   [id number]                          ROOM:  ACCOUNT NO:   [de-identified]                           ADMIT DATE: 2020  PROVIDER:     Zeke Meléndez MD    DATE OF PROCEDURE:  2020    PREOPERATIVE DIAGNOSIS:  Umbilical hernia. POSTOPERATIVE DIAGNOSIS:  Umbilical hernia. PROCEDURE:  Umbilical hernia repair with mesh. ANESTHESIA:  Local with MAC. SURGEON:  Zeke Meléndez MD    ESTIMATED BLOOD LOSS:  Less than 50 mL. INDICATIONS:  The patient is 75-year-old gentleman who presented with  abdominal pain, was found have an umbilical hernia. OPERATIVE SUMMARY:  After preoperative evaluation, the patient was  brought to the operating suite and placed in a comfortable supine  position on the operating table. Monitoring equipment was attached. He  was given intravenous sedation per Anesthesia. His abdomen was  sterilely prepped and draped. A semicircular incision was made inferior to the umbilicus after  injecting the area with local anesthetic. This was dissected down to  the fascia and the umbilicus was encircled and dissected off the  underlying hernia sac. The hernia sac was reduced and the fascia was  cleaned off back to healthy fascia. The preperitoneal space was enlarged and a small Ventrale ST mesh was  obtained. It was placed in the preperitoneal space and splayed out  appropriately. It was pulled up snuggly against the anterior abdominal  wall. The fascial defect was then closed with a running suture of #1  Prolene, incorporating the tail and mesh in the closure. The tail of the mesh was then cut off at the level of the fascia and the  base of the umbilicus was secured back down to the fascia with a  figure-of-eight suture of 0 Vicryl. Subcutaneous tissues were  approximated with interrupted sutures of 3-0 Vicryl and the skin was  closed with a running subcuticular suture of 4-0 Vicryl. Benzoin,  Steri-Strips, and dry sterile dressings were applied. All sponge,  needle, and instrument counts were correct at the end of case. The patient tolerated the procedure well and was taken to the recovery  area in a stable condition.         Nikki Zaman MD    D: 11/09/2020 12:43:36       T: 11/09/2020 12:49:41     [email or web address]  Job#: [id number]     Doc#: [id number]    CC:

## 2020-11-09 NOTE — ANESTHESIA PRE PROCEDURE
Department of Anesthesiology  Preprocedure Note       Name:  Magda Edward   Age:  54 y.o.  :  1965                                          MRN:  [id number]         Date:  2020      Surgeon: Charmaine Kelly):  Sangeetha Vallecillo MD    Procedure: Procedure(s): UMBILICAL HERNIA REPAIR WITH MESH -SLEEP APNEA-    Medications prior to admission:   Prior to Admission medications    Medication Sig Start Date End Date Taking? Authorizing Provider   lisinopril (PRINIVIL;ZESTRIL) 10 MG tablet Take 1 tablet by mouth daily 20  Yes JALEN Garcia CNP   omeprazole (PRILOSEC) 20 MG delayed release capsule TAKE 1 CAPSULE BY MOUTH  TWICE DAILY  Patient taking differently: Take 20 mg by mouth Daily  10/25/19  Yes Eveline Huffman MD   ascorbic acid (VITAMIN C) 500 MG tablet Take 500 mg by mouth daily.    Yes Historical Provider, MD   Potassium Gluconate 550 MG TABS Take 550 mg by mouth 2 times daily    Yes Historical Provider, MD   Ginkgo Biloba 40 MG TABS Take 120 mg by mouth daily    Historical Provider, MD   meloxicam (MOBIC) 15 MG tablet Take 1 tablet by mouth daily 20   JALEN Garcia CNP   methocarbamol (ROBAXIN) 750 MG tablet Take 1 tablet by mouth 4 times daily  Patient taking differently: Take 750 mg by mouth 4 times daily as needed  19   Eveline Huffman MD       Current medications:    Current Facility-Administered Medications   Medication Dose Route Frequency Provider Last Rate Last Dose    lidocaine 1 % injection 2 mL  2 mL Intradermal Once PRN Gil García MD        lactated ringers infusion   Intravenous Continuous Gil García  mL/hr at 20 1027      ceFAZolin (ANCEF) 2 g in dextrose 5 % 100 mL IVPB  2 g Intravenous On Call to Luis Miguel Castro MD        sodium chloride flush 0.9 % injection 10 mL  10 mL Intravenous 2 times per day Sangeetha Vallecillo MD        sodium chloride flush 0.9 % injection 10 mL  10 mL Intravenous PRN Lucian Spurling MD [place]        CEFAZOLIN 2 G D5W 100 ML IVPB IVPB                Allergies: Allergies   Allergen Reactions    No Known Allergies        Problem List:    Patient Active Problem List   Diagnosis Code    Chest pain R07.9    GERD (gastroesophageal reflux disease) K21.9    Obesity E66.9    Lateral epicondylitis M77.10    Shoulder impingement M75.40    NOLVIA on CPAP G47.33, Z99.89    Right knee pain M84.316    Plica syndrome, 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    Exertional dyspnea R06.00    Screen for colon cancer Z12.11    Colon, diverticulosis K57.30    Chest discomfort R07.89    Heartburn R12    Schatzki's ring of distal esophagus K22.2       Past Medical History:        Diagnosis Date    Arthritis     Chronic back pain     GERD (gastroesophageal reflux disease)     HTN (hypertension)     Sleep apnea     mild-no longer uses CPAP       Past Surgical History:        Procedure Laterality Date    BACK SURGERY      x 2    CARPAL TUNNEL RELEASE Right     CERVICAL DISC SURGERY      COLONOSCOPY  16    COLONOSCOPY N/A 10/23/2020    COLON W/ANES. performed by Eladio Villatoro MD at [address] [place] [address] ARTHROSCOPY Right     LASIK Bilateral 2013    TONSILLECTOMY      UPPER GASTROINTESTINAL ENDOSCOPY  10/23/2020    and colonoscopy    UPPER GASTROINTESTINAL ENDOSCOPY N/A 10/23/2020    EGD W/ANES.  (10:30) performed by Eladio Villatoro MD at [facility]. [place] [address] History:    Social History     Tobacco Use    Smoking status: Former Smoker     Packs/day: 1.50     Years: 21.00     Pack years: 31.50     Types: Cigarettes     Last attempt to quit: 2006     Years since quittin.1    Smokeless tobacco: Never Used   Substance Use Topics    Alcohol use: Yes     Comment: 2 drinks per week                                Counseling given: Not Answered      Vital Signs (Current):   Vitals:    20 1352 20 1024   BP:  117/73   Pulse:  67   Resp:  18   Temp:  96.8 °F (36 °C)   TempSrc: Temporal   SpO2:  94%   Weight: 244 lb (110.7 kg) 245 lb (111.1 kg)   Height: 6' 3\" (1.905 m) 6' 3\" (1.905 m)                                              BP Readings from Last 3 Encounters:   11/09/20 117/73   10/23/20 121/89   10/23/20 103/69       NPO Status: Time of last liquid consumption: 0000                        Time of last solid consumption: 2200                        Date of last liquid consumption: 11/09/20                        Date of last solid food consumption: 11/08/20    BMI:   Wt Readings from Last 3 Encounters:   11/09/20 245 lb (111.1 kg)   10/23/20 244 lb (110.7 kg)   10/20/20 245 lb 12.8 oz (111.5 kg)     Body mass index is 30.62 kg/m². CBC:   Lab Results   Component Value Date    WBC 8.0 09/16/2020    RBC 4.73 09/16/2020    HGB 14.2 09/16/2020    HCT 42.4 09/16/2020    MCV 89.5 09/16/2020    RDW 14.0 09/16/2020     09/16/2020       CMP:   Lab Results   Component Value Date     09/16/2020    K 4.1 09/16/2020     09/16/2020    CO2 26 09/16/2020    BUN 13 09/16/2020    CREATININE 0.9 09/16/2020    GFRAA >60 09/16/2020    AGRATIO 1.7 09/15/2020    LABGLOM >60 09/16/2020    GLUCOSE 115 09/16/2020    PROT 7.0 09/15/2020    CALCIUM 9.3 09/16/2020    BILITOT 0.3 09/15/2020    ALKPHOS 81 09/15/2020    AST 19 09/15/2020    ALT 25 09/15/2020       POC Tests: No results for input(s): POCGLU, POCNA, POCK, POCCL, POCBUN, POCHEMO, POCHCT in the last 72 hours.     Coags:   Lab Results   Component Value Date    PROTIME 10.5 02/16/2015    INR 0.97 02/16/2015    APTT 30.5 07/15/2014       HCG (If Applicable): No results found for: PREGTESTUR, PREGSERUM, HCG, HCGQUANT     ABGs: No results found for: PHART, PO2ART, GFG6OFR, DQB1ZFY, BEART, A4NHRFRO     Type & Screen (If Applicable):  Lab Results   Component Value Date    LABABO O%POS^^POSITIVE 07/15/2014       Drug/Infectious Status (If Applicable):  No results found for: HIV, HEPCAB    COVID-19 Screening (If Applicable):   Lab Results Component Value Date    COVID19 NOT DETECTED 2020         Anesthesia Evaluation  Patient summary reviewed no history of anesthetic complications:   Airway: Mallampati: II  TM distance: >3 FB   Neck ROM: full  Mouth opening: > = 3 FB Dental:    (+) upper dentures and lower dentures      Pulmonary:Negative Pulmonary ROS and normal exam  breath sounds clear to auscultation  (+) sleep apnea:                             Cardiovascular:Negative CV ROS  Exercise tolerance: good (>4 METS),   (+) hypertension:,         Rhythm: regular  Rate: normal                    Neuro/Psych:   Negative Neuro/Psych ROS              GI/Hepatic/Renal: Neg GI/Hepatic/Renal ROS  (+) GERD:,           Endo/Other: Negative Endo/Other ROS                    Abdominal:           Vascular: negative vascular ROS. Anesthesia Plan      MAC     ASA 2     (I discussed with the patient the risks and benefits of PIV, anesthesia, IV Narcotics, PACU. All questions were answered the patient agrees with the plan and wishes to proceed)  Induction: intravenous. Pre-Operative Diagnosis: Umbilical hernia with obstruction [K42.0]    54 y.o.   BMI:  Body mass index is 30.62 kg/m².      Vitals:    20 1352 20 1024   BP:  117/73   Pulse:  67   Resp:  18   Temp:  96.8 °F (36 °C)   TempSrc:  Temporal   SpO2:  94%   Weight: 244 lb (110.7 kg) 245 lb (111.1 kg)   Height: 6' 3\" (1.905 m) 6' 3\" (1.905 m)       Allergies   Allergen Reactions    No Known Allergies        Social History     Tobacco Use    Smoking status: Former Smoker     Packs/day: 1.50     Years: 21.00     Pack years: 31.50     Types: Cigarettes     Last attempt to quit: 2006     Years since quittin.1    Smokeless tobacco: Never Used   Substance Use Topics    Alcohol use: Yes     Comment: 2 drinks per week       LABS:    CBC  Lab Results   Component Value Date/Time    WBC 8.0 2020 04:38 AM    HGB 14.2 2020 04:38 AM    HCT 42.4 09/16/2020 04:38 AM     09/16/2020 04:38 AM     RENAL  Lab Results   Component Value Date/Time     09/16/2020 04:38 AM    K 4.1 09/16/2020 04:38 AM     09/16/2020 04:38 AM    CO2 26 09/16/2020 04:38 AM    BUN 13 09/16/2020 04:38 AM    CREATININE 0.9 09/16/2020 04:38 AM    GLUCOSE 115 (H) 09/16/2020 04:38 AM     COAGS  Lab Results   Component Value Date/Time    PROTIME 10.5 02/16/2015 11:04 AM    INR 0.97 02/16/2015 11:04 AM    APTT 30.5 07/15/2014 11:00 AM     Stress test 9/15/2020   Summary     Normal LV function.    Doreen Seller is uniform isotope uptake at stress and rest. There is no clear     evidence of myocardial ischemia     Excellent functional capacity at >11 METs        EKG 9/15/2020  Normal sinus rhythm with sinus arrhythmiaConfirmed by Eileen Tolentino MD, Nay Funes ([id number]) on 9/15/2020 3:54:18 PM            Franca Shannon MD   11/9/2020

## 2020-11-17 ENCOUNTER — OFFICE VISIT (OUTPATIENT)
Dept: ORTHOPEDIC SURGERY | Age: 55
End: 2020-11-17
Payer: COMMERCIAL

## 2020-11-17 ENCOUNTER — TELEPHONE (OUTPATIENT)
Dept: SURGERY | Age: 55
End: 2020-11-17

## 2020-11-17 VITALS — BODY MASS INDEX: 30.46 KG/M2 | WEIGHT: 245 LBS | HEIGHT: 75 IN

## 2020-11-17 PROCEDURE — 3017F COLORECTAL CA SCREEN DOC REV: CPT | Performed by: ORTHOPAEDIC SURGERY

## 2020-11-17 PROCEDURE — G8417 CALC BMI ABV UP PARAM F/U: HCPCS | Performed by: ORTHOPAEDIC SURGERY

## 2020-11-17 PROCEDURE — G8427 DOCREV CUR MEDS BY ELIG CLIN: HCPCS | Performed by: ORTHOPAEDIC SURGERY

## 2020-11-17 PROCEDURE — 99214 OFFICE O/P EST MOD 30 MIN: CPT | Performed by: ORTHOPAEDIC SURGERY

## 2020-11-17 PROCEDURE — 1036F TOBACCO NON-USER: CPT | Performed by: ORTHOPAEDIC SURGERY

## 2020-11-17 PROCEDURE — G8484 FLU IMMUNIZE NO ADMIN: HCPCS | Performed by: ORTHOPAEDIC SURGERY

## 2020-11-17 PROCEDURE — 20551 NJX 1 TENDON ORIGIN/INSJ: CPT | Performed by: ORTHOPAEDIC SURGERY

## 2020-11-17 RX ORDER — BUPIVACAINE HYDROCHLORIDE 2.5 MG/ML
60 INJECTION, SOLUTION INFILTRATION; PERINEURAL ONCE
Status: COMPLETED | OUTPATIENT
Start: 2020-11-17 | End: 2020-11-17

## 2020-11-17 RX ORDER — METHYLPREDNISOLONE ACETATE 40 MG/ML
40 INJECTION, SUSPENSION INTRA-ARTICULAR; INTRALESIONAL; INTRAMUSCULAR; SOFT TISSUE ONCE
Status: COMPLETED | OUTPATIENT
Start: 2020-11-17 | End: 2020-11-17

## 2020-11-17 RX ADMIN — BUPIVACAINE HYDROCHLORIDE 150 MG: 2.5 INJECTION, SOLUTION INFILTRATION; PERINEURAL at 13:55

## 2020-11-17 RX ADMIN — METHYLPREDNISOLONE ACETATE 40 MG: 40 INJECTION, SUSPENSION INTRA-ARTICULAR; INTRALESIONAL; INTRAMUSCULAR; SOFT TISSUE at 13:54

## 2020-11-17 NOTE — TELEPHONE ENCOUNTER
Patient called to ask if he can have MRI of knee since he recently had Umbilical hernia repair. Per Luisana Caba, [address] [place], it is ok to have MRI. Patient informed.

## 2020-11-17 NOTE — PROGRESS NOTES
KNEE VISIT      HISTORY OF PRESENT ILLNESS    Shane Walker is a 54 y.o. male who presents for evaluation of right knee pain. He is still persistent having pain despite intervention is had the past. The neck step was to consider scanning. Additionally he is requesting cortisone injection to right elbow for diagnosis of lateral epicondylitis that he has had in the past    ROS    Well-documented patient history form dated 3/23/2020  All other ROS negative except for above. Past Surgical history    Past Surgical History:   Procedure Laterality Date    BACK SURGERY      x 2    CARPAL TUNNEL RELEASE Right     CERVICAL DISC SURGERY      COLONOSCOPY  1/28/16    COLONOSCOPY N/A 10/23/2020    COLON W/ANES. performed by Martínez Bustos MD at [address] [facility] ARTHROSCOPY Right     LASIK Bilateral 2013    TONSILLECTOMY      UMBILICAL HERNIA REPAIR N/A 30/1/0185    UMBILICAL HERNIA REPAIR WITH MESH -SLEEP APNEA- performed by Jordan Joshi MD at [address]  10/23/2020    and colonoscopy    UPPER GASTROINTESTINAL ENDOSCOPY N/A 10/23/2020    EGD W/ANES.  (10:30) performed by Martínez Bustos MD at [facility]    Past Medical History:   Diagnosis Date    Arthritis     Chronic back pain     GERD (gastroesophageal reflux disease)     HTN (hypertension)     Sleep apnea     mild-no longer uses CPAP       Allergies    Allergies   Allergen Reactions    No Known Allergies        Meds    Current Outpatient Medications   Medication Sig Dispense Refill    Ginkgo Biloba 40 MG TABS Take 120 mg by mouth daily      meloxicam (MOBIC) 15 MG tablet Take 1 tablet by mouth daily 90 tablet 1    lisinopril (PRINIVIL;ZESTRIL) 10 MG tablet Take 1 tablet by mouth daily 90 tablet 3    methocarbamol (ROBAXIN) 750 MG tablet Take 1 tablet by mouth 4 times daily (Patient taking differently: Take 750 mg by mouth 4 times daily as needed ) 120 tablet 0  omeprazole (PRILOSEC) 20 MG delayed release capsule TAKE 1 CAPSULE BY MOUTH  TWICE DAILY (Patient taking differently: Take 20 mg by mouth Daily ) 180 capsule 1    ascorbic acid (VITAMIN C) 500 MG tablet Take 500 mg by mouth daily.       Potassium Gluconate 550 MG TABS Take 550 mg by mouth 2 times daily        Current Facility-Administered Medications   Medication Dose Route Frequency Provider Last Rate Last Dose    bupivacaine (MARCAINE) 0.25 % injection 150 mg  60 mL Intra-articular Once Larisa Filler, MD        methylPREDNISolone acetate (DEPO-MEDROL) injection 40 mg  40 mg Intra-articular Once Larisa Filler, MD           Social    Social History     Socioeconomic History    Marital status: Single     Spouse name: Not on file    Number of children: Not on file    Years of education: Not on file    Highest education level: Not on file   Occupational History    Not on file   Social Needs    Financial resource strain: Not on file    Food insecurity     Worry: Not on file     Inability: Not on file    Transportation needs     Medical: Not on file     Non-medical: Not on file   Tobacco Use    Smoking status: Former Smoker     Packs/day: 1.50     Years: 21.00     Pack years: 31.50     Types: Cigarettes     Last attempt to quit: 2006     Years since quittin.1    Smokeless tobacco: Never Used   Substance and Sexual Activity    Alcohol use: Yes     Comment: 2 drinks per week    Drug use: No    Sexual activity: Yes     Partners: Female   Lifestyle    Physical activity     Days per week: Not on file     Minutes per session: Not on file    Stress: Not on file   Relationships    Social connections     Talks on phone: Not on file     Gets together: Not on file     Attends [employer] service: Not on file     Active member of club or organization: Not on file     Attends meetings of clubs or organizations: Not on file     Relationship status: Not on file    Intimate partner violence     Fear of current or ex partner: Not on file     Emotionally abused: Not on file     Physically abused: Not on file     Forced sexual activity: Not on file   Other Topics Concern    Not on file   Social History Narrative    Not on file       Family HISTORY    Family History   Problem Relation Age of Onset    High Blood Pressure Mother     Diabetes Mother     Kidney Disease Mother     Heart Disease Mother     Parkinsonism Father     Dementia Father     Cancer Father     Other Brother         motocycle accident       PHYSICAL EXAM    Vital Signs:  Ht 6' 3\" (1.905 m)   Wt 245 lb (111.1 kg)   BMI 30.62 kg/m²   General Appearance:  Normal body habitus. Alert and oriented to person, place, and time. Affect:  Normal.   Gait:  Normal. Good balance and coordination. Skin:  Intact. Sensation:  Intact. Strength:  Intact. Reflexes:  Intact. Pulses:  Intact. Knee Exam:    Effusion: Negative    Range of Motion Right Left   Extension 0 0   Flexion 115 115     Provocative Test Right Left    Positive Negative Positive Negative   Anterior drawer [] [x] [] [x]   Lachman [] [x] [] [x]   Posterior drawer [] [x] [] [x]   Varus testing [] [x] [] [x]   Valgus testing [] [x] [] [x]   Joint line tenderness [] [x] [] [x]     Additional Exam Comments: His neurocirculatory lymphatic exam otherwise is normal symmetric to both lower extremities. He does have some anteromedial pain over the medial femoral condyle consistent with the area where his medial plica is located. He is having more medial joint line tenderness today to direct palpation and valgus stress. On exam to his right elbow he has full active and passive range of motion. His neurocirculatory lymphatic exam is otherwise normal symmetric both upper extremities. He has tenderness to palpation over the lateral epicondyle as well as to resistance to wrist extension. .    IMAGING STUDIES    X-rays were not done today    IMPRESSION    Right knee pain secondary to

## 2020-11-23 ENCOUNTER — OFFICE VISIT (OUTPATIENT)
Dept: SURGERY | Age: 55
End: 2020-11-23

## 2020-11-23 VITALS
TEMPERATURE: 97.1 F | WEIGHT: 241 LBS | SYSTOLIC BLOOD PRESSURE: 128 MMHG | DIASTOLIC BLOOD PRESSURE: 74 MMHG | HEIGHT: 75 IN | BODY MASS INDEX: 29.97 KG/M2

## 2020-11-23 PROCEDURE — 99024 POSTOP FOLLOW-UP VISIT: CPT | Performed by: SURGERY

## 2020-11-23 NOTE — PROGRESS NOTES
[facility] GENERAL SURGERY      S:   Patient presents s/p umbilical hernia repair with mesh. He reports doing well. O:   Comfortable         Incision site healing well. A:   S/P umbilical hernia repair with mesh    P:   Follow up as needed.

## 2020-12-01 ENCOUNTER — OFFICE VISIT (OUTPATIENT)
Dept: ORTHOPEDIC SURGERY | Age: 55
End: 2020-12-01
Payer: COMMERCIAL

## 2020-12-01 VITALS — HEIGHT: 75 IN | WEIGHT: 241 LBS | BODY MASS INDEX: 29.97 KG/M2

## 2020-12-01 PROBLEM — S83.241A TEAR OF MEDIAL MENISCUS OF RIGHT KNEE: Status: ACTIVE | Noted: 2020-12-01

## 2020-12-01 PROCEDURE — 99213 OFFICE O/P EST LOW 20 MIN: CPT | Performed by: ORTHOPAEDIC SURGERY

## 2020-12-01 NOTE — PROGRESS NOTES
KNEE VISIT      HISTORY OF PRESENT ILLNESS    Marni Walker is a 54 y.o. male who presents for evaluation of right knee pain. The nature and character of his pain remain the same and he is here today after his MRI for disposition. \  ROS    Well-documented patient history form dated 3/23/2020  All other ROS negative except for above. Past Surgical history    Past Surgical History:   Procedure Laterality Date    BACK SURGERY      x 2    CARPAL TUNNEL RELEASE Right     CERVICAL DISC SURGERY      COLONOSCOPY  1/28/16    COLONOSCOPY N/A 10/23/2020    COLON W/ANES. performed by Johnny Croft MD at [address] [place] [address] ARTHROSCOPY Right     LASIK Bilateral 2013    TONSILLECTOMY      UMBILICAL HERNIA REPAIR N/A 73/0/6582    UMBILICAL HERNIA REPAIR WITH MESH -SLEEP APNEA- performed by Sangeetha Vallecillo MD at [place] [address]  10/23/2020    and colonoscopy    UPPER GASTROINTESTINAL ENDOSCOPY N/A 10/23/2020    EGD W/ANES.  (10:30) performed by Johnny Croft MD at [facility]    Past Medical History:   Diagnosis Date    Arthritis     Chronic back pain     GERD (gastroesophageal reflux disease)     HTN (hypertension)     Sleep apnea     mild-no longer uses CPAP       Allergies    Allergies   Allergen Reactions    No Known Allergies        Meds    Current Outpatient Medications   Medication Sig Dispense Refill    Ginkgo Biloba 40 MG TABS Take 120 mg by mouth daily      meloxicam (MOBIC) 15 MG tablet Take 1 tablet by mouth daily 90 tablet 1    lisinopril (PRINIVIL;ZESTRIL) 10 MG tablet Take 1 tablet by mouth daily 90 tablet 3    methocarbamol (ROBAXIN) 750 MG tablet Take 1 tablet by mouth 4 times daily (Patient taking differently: Take 750 mg by mouth 4 times daily as needed ) 120 tablet 0    omeprazole (PRILOSEC) 20 MG delayed release capsule TAKE 1 CAPSULE BY MOUTH  TWICE DAILY (Patient taking differently: Take 20 mg by mouth Daily ) 180 capsule 1    ascorbic acid (VITAMIN C) 500 MG tablet Take 500 mg by mouth daily.  Potassium Gluconate 550 MG TABS Take 550 mg by mouth 2 times daily        No current facility-administered medications for this visit.         Social    Social History     Socioeconomic History    Marital status: Single     Spouse name: Not on file    Number of children: Not on file    Years of education: Not on file    Highest education level: Not on file   Occupational History    Not on file   Social Needs    Financial resource strain: Not on file    Food insecurity     Worry: Not on file     Inability: Not on file    Transportation needs     Medical: Not on file     Non-medical: Not on file   Tobacco Use    Smoking status: Former Smoker     Packs/day: 1.50     Years: 21.00     Pack years: 31.50     Types: Cigarettes     Last attempt to quit: 2006     Years since quittin.1    Smokeless tobacco: Never Used   Substance and Sexual Activity    Alcohol use: Yes     Comment: 2 drinks per week    Drug use: No    Sexual activity: Yes     Partners: Female   Lifestyle    Physical activity     Days per week: Not on file     Minutes per session: Not on file    Stress: Not on file   Relationships    Social connections     Talks on phone: Not on file     Gets together: Not on file     Attends Muslim service: Not on file     Active member of club or organization: Not on file     Attends meetings of clubs or organizations: Not on file     Relationship status: Not on file    Intimate partner violence     Fear of current or ex partner: Not on file     Emotionally abused: Not on file     Physically abused: Not on file     Forced sexual activity: Not on file   Other Topics Concern    Not on file   Social History Narrative    Not on file       Family HISTORY    Family History   Problem Relation Age of Onset    High Blood Pressure Mother     Diabetes Mother     Kidney Disease Mother     Heart Disease Mother     Parkinsonism Father     Dementia Father     Cancer Father     Other Brother         motocycle accident       PHYSICAL EXAM    Vital Signs:  Ht 6' 3\" (1.905 m)   Wt 241 lb (109.3 kg)   BMI 30.12 kg/m²   General Appearance:  Normal body habitus. Alert and oriented to person, place, and time. Affect:  Normal.   Gait:  Normal. Good balance and coordination. Skin:  Intact. Sensation:  Intact. Strength:  Intact. Reflexes:  Intact. Pulses:  Intact. Knee Exam:    Effusion: Negative    Range of Motion Right Left   Extension 0 0   Flexion 115 115     Provocative Test Right Left    Positive Negative Positive Negative   Anterior drawer [] [x] [] [x]   Lachman [] [x] [] [x]   Posterior drawer [] [x] [] [x]   Varus testing [] [x] [] [x]   Valgus testing [] [x] [] [x]   Joint line tenderness [] [x] [] [x]     Additional Exam Comments: His neurocirculatory lymphatic exam otherwise is normal symmetric to both lower extremities. He does have some anteromedial pain over the medial femoral condyle consistent with the area where his medial plica is located. He is having more medial joint line tenderness today to direct palpation and valgus stress. IMAGING STUDIES    MRI of his right knee demonstrates a medial meniscus tear cleavage type    IMPRESSION    Right knee pain secondary to medial  meniscus tear      PLAN      1. Conservative care options including physical therapy, NSAIDs, bracing, and activity modification were discussed. 2.  The indications for therapeutic injections were discussed. 3.  The indications for additional imaging studies were discussed. 4.  After considering the various options discussed, the patient elected to pursue a course that includes right knee video arthroscopy with partial medial meniscectomy.     After an evaluation of this patient and a review of her radiographic testing, it would be my opinion that the symptoms, for which I am treating her are mechanical in origin. Although she has concomitant osteoarthritic changes, her joint space has greater than 50% of the articular surfaces left within the knee compartment. Additionally she is failed to improve over time with a physician directed physical therapy program, consideration of bracing, medications, and injections. It is with those observations both objective and subjective that I would recommend proceeding with arthroscopic intervention to her need to address the meniscal pathology. INFORMED CONSENT NOTE        We discussed the risks, benefits, and alternatives to the proposed procedure, as well as the necessity of other members of the healthcare team participating in the procedure. All questions were answered and the patient elected to proceed with the proposed procedure and signed the informed consent form. The patient was counseled at length about the risks of juancho Covid-19 during their perioperative period and any recovery window from their procedure. The patient was made aware that juancho Covid-19  may worsen their prognosis for recovering from their procedure  and lend to a higher morbidity and/or mortality risk. All material risks, benefits, and reasonable alternatives including postponing the procedure were discussed. The patient does wish to proceed with the procedure at this time.

## 2020-12-02 ENCOUNTER — TELEPHONE (OUTPATIENT)
Dept: ORTHOPEDIC SURGERY | Age: 55
End: 2020-12-02

## 2020-12-08 ENCOUNTER — HOSPITAL ENCOUNTER (OUTPATIENT)
Age: 55
Discharge: HOME | End: 2020-12-08
Payer: COMMERCIAL

## 2020-12-08 PROCEDURE — U0003 INFECTIOUS AGENT DETECTION BY NUCLEIC ACID (DNA OR RNA); SEVERE ACUTE RESPIRATORY SYNDROME CORONAVIRUS 2 (SARS-COV-2) (CORONAVIRUS DISEASE [COVID-19]), AMPLIFIED PROBE TECHNIQUE, MAKING USE OF HIGH THROUGHPUT TECHNOLOGIES AS DESCRIBED BY CMS-2020-01-R: HCPCS

## 2020-12-09 ENCOUNTER — OFFICE VISIT (OUTPATIENT)
Dept: FAMILY MEDICINE CLINIC | Age: 55
End: 2020-12-09
Payer: COMMERCIAL

## 2020-12-09 VITALS
WEIGHT: 246.8 LBS | DIASTOLIC BLOOD PRESSURE: 82 MMHG | OXYGEN SATURATION: 98 % | HEART RATE: 70 BPM | TEMPERATURE: 96.9 F | SYSTOLIC BLOOD PRESSURE: 118 MMHG | BODY MASS INDEX: 30.85 KG/M2

## 2020-12-09 LAB — SARS-COV-2: NOT DETECTED

## 2020-12-09 PROCEDURE — 90471 IMMUNIZATION ADMIN: CPT | Performed by: FAMILY MEDICINE

## 2020-12-09 PROCEDURE — G8417 CALC BMI ABV UP PARAM F/U: HCPCS | Performed by: FAMILY MEDICINE

## 2020-12-09 PROCEDURE — 90686 IIV4 VACC NO PRSV 0.5 ML IM: CPT | Performed by: FAMILY MEDICINE

## 2020-12-09 PROCEDURE — 99212 OFFICE O/P EST SF 10 MIN: CPT | Performed by: FAMILY MEDICINE

## 2020-12-09 PROCEDURE — G8482 FLU IMMUNIZE ORDER/ADMIN: HCPCS | Performed by: FAMILY MEDICINE

## 2020-12-09 PROCEDURE — G8427 DOCREV CUR MEDS BY ELIG CLIN: HCPCS | Performed by: FAMILY MEDICINE

## 2020-12-09 NOTE — TELEPHONE ENCOUNTER
Anoop Dumont [id number]    Date: 12/14/20  Type of SX:  OUTPATIENT  Location: [facility]  CPT: 16410   SX area: [address]

## 2020-12-09 NOTE — PROGRESS NOTES
[facility]  [phone]  Fax: [phone]   Pre-operative History and Physical      DIAGNOSIS:  Right knee meniscus tear    PROCEDURE:  Arthroscopic right knee meniscus repair/debridement      History Obtained From:  patient    HISTORY OF PRESENT ILLNESS:    The patient is a 54 y.o. male with significant past medical history of right knee pain for about 3 months who presents preop exam       Past Medical History:   Diagnosis Date    Arthritis     Chronic back pain     GERD (gastroesophageal reflux disease)     HTN (hypertension)     Sleep apnea     mild-no longer uses CPAP     Past Surgical History:   Procedure Laterality Date    BACK SURGERY      x 2    CARPAL TUNNEL RELEASE Right     CERVICAL DISC SURGERY      COLONOSCOPY  1/28/16    COLONOSCOPY N/A 10/23/2020    COLON W/ANES. performed by Tiffany Buitrago MD at [address] [facility] ARTHROSCOPY Right     LASIK Bilateral 2013    TONSILLECTOMY      UMBILICAL HERNIA REPAIR N/A 26/5/9535    UMBILICAL HERNIA REPAIR WITH MESH -SLEEP APNEA- performed by Silva Velazquez MD at [address]  10/23/2020    and colonoscopy    UPPER GASTROINTESTINAL ENDOSCOPY N/A 10/23/2020    EGD W/ANES.  (10:30) performed by Tiffany Buitrago MD at [facility]     Current Outpatient Medications   Medication Sig Dispense Refill    Ginkgo Biloba 40 MG TABS Take 120 mg by mouth daily      meloxicam (MOBIC) 15 MG tablet Take 1 tablet by mouth daily 90 tablet 1    lisinopril (PRINIVIL;ZESTRIL) 10 MG tablet Take 1 tablet by mouth daily 90 tablet 3    methocarbamol (ROBAXIN) 750 MG tablet Take 1 tablet by mouth 4 times daily (Patient taking differently: Take 750 mg by mouth 4 times daily as needed ) 120 tablet 0    omeprazole (PRILOSEC) 20 MG delayed release capsule TAKE 1 CAPSULE BY MOUTH  TWICE DAILY (Patient taking differently: Take 20 mg by mouth Daily ) 180 capsule 1 Neck:  Supple, symmetrical, trachea midline, no adenopathy, thyroid symmetric, not enlarged and no tenderness, skin normal, No carotid bruit    Heart:  Normal apical impulse, regular rate and rhythm, normal S1 and S2, no S3 or S4, and no murmur noted    Lungs:  No increased work of breathing, good air exchange, clear to auscultation bilaterally, no crackles or wheezing    Abdomen:  No scars, normal bowel sounds, soft, non-distended, non-tender, no masses palpated, no hepatosplenomegally    Extremities:  No clubbing, cyanosis, or edema    NEUROLOGIC:  Awake, alert, oriented to name, place and time. Cranial nerves II-XII are grossly intact. Motor is 5 out of 5 bilaterally. ASSESSMENT AND PLAN:    1. Patient is a 54 y.o. male with above specified procedure planned on 12/14/20 with Dr. Mary Markham at [facility]. 2. Stop ASA/NSAIDS medications 7-10 days prior to procedure. (also will stop gingko)  3.   Cleared for surgery    Vale Harrington M.D    [address] [place] [address]  [phone]

## 2020-12-09 NOTE — PROGRESS NOTES
Vaccine Information Sheet, \"Influenza - Inactivated\"  given to [facility], or parent/legal guardian of  Parker Walker and verbalized understanding. Patient responses:    Have you ever had a reaction to a flu vaccine? No  Do you have any current illness? No  Have you ever had Guillian Macfarlan Syndrome? No  Do you have a serious allergy to any of the follow: Neomycin, Polymyxin, Thimerosal, eggs or egg products? No    Flu vaccine given per order. Please see immunization tab. Risks and benefits explained. Current VIS given.

## 2020-12-11 ENCOUNTER — ANESTHESIA EVENT (OUTPATIENT)
Dept: OPERATING ROOM | Age: 55
End: 2020-12-11
Payer: COMMERCIAL

## 2020-12-11 NOTE — PROGRESS NOTES
PRE OP INSTRUCTION SHEET   1. Do not eat or drink anything after 12 midnight  prior to surgery. This includes no water, chewing gum or mints. 2. Take the following pills will a small sip of water (see MAR)                                        3. Aspirin, Ibuprofen, Advil, Naproxen, Vitamin E, fish oil and other Anti-inflammatory products should be stopped for 5 days before surgery or as directed by your physician. 4. Check with your Doctor regarding stopping Plavix, Coumadin, Lovenox, Fragmin or other blood thinners   5. Do not smoke, and do not drink any alcoholic beverages 24 hours prior to surgery. This includes NA Beer. 6. You may brush your teeth and gargle the morning of surgery. DO NOT SWALLOW WATER   7. You MUST make arrangements for a responsible adult to take you home after your surgery. You will not be allowed to leave alone or drive yourself home. It is strongly suggested someone stay with you the first 24 hrs. Your surgery will be cancelled if you do not have a ride home. 8. A parent/legal guardian must accompany a child scheduled for surgery and plan to stay at the hospital until the child is discharged. Please do not bring other children with you. 9. Please wear simple, loose fitting clothing to the hospital.  Monty Cruz not bring valuables (money, credit cards, checkbooks, etc.) Do not wear any makeup (including no eye makeup) or nail polish on your fingers or toes. 10. DO NOT wear any jewelry or piercings on day of surgery. All body piercing jewelry must be removed. 11. If you have dentures,glasses, or contacts they will be removed before going to the OR; we will provide you a container. 12. Please see your family doctor/and cardiologist for a history & physical and/or concerning medications. Bring any test results/reports from your physician's office. Have history and labs faxed to [phone].  Remember to bring Blood Bank bracelet on the day of surgery. 14. If you have a Living Will and Durable Power of  for Healthcare, please bring in a copy. 13. Notify your Surgeon if you develop any illness between now and surgery  time, cough, cold, fever, sore throat, nausea, vomiting, etc.  Please notify your surgeon if you experience dizziness, shortness of breath or blurred vision between now & the time of your surgery   16. DO NOT shave your operative site 96 hours prior to surgery. For face & neck surgery, men may use an electric razor 48 hours prior to surgery. 17. Shower with _x__Antibacterial soap (x_chlorhexidine for total joint  Pt's) shower two times before surgery.(the morning of and the night before. 18. To provide excellent care visitors will be limited to one in the room at any given time.   Please call pre admission testing if you any further questions [phone] or [phone]

## 2020-12-11 NOTE — PROGRESS NOTES
Preoperative Screening for Elective Surgery/Invasive Procedures While COVID-19 present in the community     Have you had any of the following symptoms?no  o Fever, chills  o Cough  o Shortness of breath  o Muscle aches/pain  o Diarrhea  o Abdominal pain, nausea, vomiting  o Loss or decrease in taste and / or smell   Risk of Exposure  o Have you recently been hospitalized for COVID-19 or flu-like illness, if so when?no  o Recently diagnosed with COVID-19, if so when?no  o Recently tested for COVID-19, if so when?yes 12/8/20 for surgery  o Have you been in close contact with a person or family member who currently has or recently had [employer]? If yes, when and in what context?no  o Do you live with anybody who in the last 14 days has had fever, chills, shortness of breath, muscle aches, flu-like illness?no  o Do you have any close contacts or family members who are currently in the hospital for COVID-19 or flu-like illness? If yes, assess recent close contact with this person. no    Indicate if the patient has a positive screen by answering yes to one or more of the above questions. Patients who test positive or screen positive prior to surgery or on the day of surgery should be evaluated in conjunction with the surgeon/proceduralist/anesthesiologist to determine the urgency of the procedure.

## 2020-12-11 NOTE — PROGRESS NOTES
PRE OP INSTRUCTION SHEET   1. Do not eat or drink anything after 12 midnight  prior to surgery. This includes no water, chewing gum or mints. 2. Take the following pills will a small sip of water (see MAR)                                        3. Aspirin, Ibuprofen, Advil, Naproxen, Vitamin E, fish oil and other Anti-inflammatory products should be stopped for 5 days before surgery or as directed by your physician. 4. Check with your Doctor regarding stopping Plavix, Coumadin, Lovenox, Fragmin or other blood thinners   5. Do not smoke, and do not drink any alcoholic beverages 24 hours prior to surgery. This includes NA Beer. 6. You may brush your teeth and gargle the morning of surgery. DO NOT SWALLOW WATER   7. You MUST make arrangements for a responsible adult to take you home after your surgery. You will not be allowed to leave alone or drive yourself home. It is strongly suggested someone stay with you the first 24 hrs. Your surgery will be cancelled if you do not have a ride home. 8. A parent/legal guardian must accompany a child scheduled for surgery and plan to stay at the hospital until the child is discharged. Please do not bring other children with you. 9. Please wear simple, loose fitting clothing to the hospital.  Joy Shingles not bring valuables (money, credit cards, checkbooks, etc.) Do not wear any makeup (including no eye makeup) or nail polish on your fingers or toes. 10. DO NOT wear any jewelry or piercings on day of surgery. All body piercing jewelry must be removed. 11. If you have dentures,glasses, or contacts they will be removed before going to the OR; we will provide you a container. 12. Please see your family doctor/and cardiologist for a history & physical and/or concerning medications. Bring any test results/reports from your physician's office. Have history and labs faxed to [phone].  Remember to bring Blood Bank bracelet on the day of surgery. 14. If you have a Living Will and Durable Power of  for Healthcare, please bring in a copy. 13. Notify your Surgeon if you develop any illness between now and surgery  time, cough, cold, fever, sore throat, nausea, vomiting, etc.  Please notify your surgeon if you experience dizziness, shortness of breath or blurred vision between now & the time of your surgery   16. DO NOT shave your operative site 96 hours prior to surgery. For face & neck surgery, men may use an electric razor 48 hours prior to surgery. 17. Shower with _x__Antibacterial soap (x_chlorhexidine for total joint  Pt's) shower two times before surgery.(the morning of and the night before. 18. To provide excellent care visitors will be limited to one in the room at any given time.   Please call pre admission testing if you any further questions [phone] or [phone]

## 2020-12-14 ENCOUNTER — ANESTHESIA (OUTPATIENT)
Dept: OPERATING ROOM | Age: 55
End: 2020-12-14
Payer: COMMERCIAL

## 2020-12-14 ENCOUNTER — HOSPITAL ENCOUNTER (OUTPATIENT)
Age: 55
Setting detail: OUTPATIENT SURGERY
Discharge: HOME | End: 2020-12-14
Attending: ORTHOPAEDIC SURGERY | Admitting: ORTHOPAEDIC SURGERY
Payer: COMMERCIAL

## 2020-12-14 ENCOUNTER — TELEPHONE (OUTPATIENT)
Dept: ORTHOPEDIC SURGERY | Age: 55
End: 2020-12-14

## 2020-12-14 VITALS
HEIGHT: 74 IN | OXYGEN SATURATION: 99 % | BODY MASS INDEX: 30.8 KG/M2 | SYSTOLIC BLOOD PRESSURE: 120 MMHG | HEART RATE: 60 BPM | DIASTOLIC BLOOD PRESSURE: 74 MMHG | TEMPERATURE: 98.2 F | RESPIRATION RATE: 12 BRPM | WEIGHT: 240 LBS

## 2020-12-14 VITALS
RESPIRATION RATE: 11 BRPM | DIASTOLIC BLOOD PRESSURE: 59 MMHG | TEMPERATURE: 98.2 F | OXYGEN SATURATION: 100 % | SYSTOLIC BLOOD PRESSURE: 100 MMHG

## 2020-12-14 PROCEDURE — 7100000010 HC PHASE II RECOVERY - FIRST 15 MIN: Performed by: ORTHOPAEDIC SURGERY

## 2020-12-14 PROCEDURE — 7100000011 HC PHASE II RECOVERY - ADDTL 15 MIN: Performed by: ORTHOPAEDIC SURGERY

## 2020-12-14 PROCEDURE — 3600000014 HC SURGERY LEVEL 4 ADDTL 15MIN: Performed by: ORTHOPAEDIC SURGERY

## 2020-12-14 PROCEDURE — 2580000003 HC RX 258: Performed by: ORTHOPAEDIC SURGERY

## 2020-12-14 PROCEDURE — 2580000003 HC RX 258: Performed by: ANESTHESIOLOGY

## 2020-12-14 PROCEDURE — 3600000004 HC SURGERY LEVEL 4 BASE: Performed by: ORTHOPAEDIC SURGERY

## 2020-12-14 PROCEDURE — 7100000001 HC PACU RECOVERY - ADDTL 15 MIN: Performed by: ORTHOPAEDIC SURGERY

## 2020-12-14 PROCEDURE — 6360000002 HC RX W HCPCS: Performed by: ANESTHESIOLOGY

## 2020-12-14 PROCEDURE — 7100000000 HC PACU RECOVERY - FIRST 15 MIN: Performed by: ORTHOPAEDIC SURGERY

## 2020-12-14 PROCEDURE — 2709999900 HC NON-CHARGEABLE SUPPLY: Performed by: ORTHOPAEDIC SURGERY

## 2020-12-14 PROCEDURE — 2500000003 HC RX 250 WO HCPCS: Performed by: NURSE ANESTHETIST, CERTIFIED REGISTERED

## 2020-12-14 PROCEDURE — 6360000002 HC RX W HCPCS: Performed by: ORTHOPAEDIC SURGERY

## 2020-12-14 PROCEDURE — 6360000002 HC RX W HCPCS: Performed by: NURSE ANESTHETIST, CERTIFIED REGISTERED

## 2020-12-14 PROCEDURE — 3700000001 HC ADD 15 MINUTES (ANESTHESIA): Performed by: ORTHOPAEDIC SURGERY

## 2020-12-14 PROCEDURE — 3700000000 HC ANESTHESIA ATTENDED CARE: Performed by: ORTHOPAEDIC SURGERY

## 2020-12-14 PROCEDURE — 6370000000 HC RX 637 (ALT 250 FOR IP): Performed by: ANESTHESIOLOGY

## 2020-12-14 PROCEDURE — 2500000003 HC RX 250 WO HCPCS: Performed by: ORTHOPAEDIC SURGERY

## 2020-12-14 RX ORDER — PROMETHAZINE HYDROCHLORIDE 25 MG/ML
6.25 INJECTION, SOLUTION INTRAMUSCULAR; INTRAVENOUS
Status: DISCONTINUED | OUTPATIENT
Start: 2020-12-14 | End: 2020-12-14 | Stop reason: HOSPADM

## 2020-12-14 RX ORDER — DIPHENHYDRAMINE HYDROCHLORIDE 50 MG/ML
12.5 INJECTION INTRAMUSCULAR; INTRAVENOUS
Status: DISCONTINUED | OUTPATIENT
Start: 2020-12-14 | End: 2020-12-14 | Stop reason: HOSPADM

## 2020-12-14 RX ORDER — HYDRALAZINE HYDROCHLORIDE 20 MG/ML
5 INJECTION INTRAMUSCULAR; INTRAVENOUS EVERY 10 MIN PRN
Status: DISCONTINUED | OUTPATIENT
Start: 2020-12-14 | End: 2020-12-14 | Stop reason: HOSPADM

## 2020-12-14 RX ORDER — DEXAMETHASONE SODIUM PHOSPHATE 4 MG/ML
INJECTION, SOLUTION INTRA-ARTICULAR; INTRALESIONAL; INTRAMUSCULAR; INTRAVENOUS; SOFT TISSUE PRN
Status: DISCONTINUED | OUTPATIENT
Start: 2020-12-14 | End: 2020-12-14 | Stop reason: SDUPTHER

## 2020-12-14 RX ORDER — OXYCODONE HYDROCHLORIDE AND ACETAMINOPHEN 5; 325 MG/1; MG/1
2 TABLET ORAL PRN
Status: COMPLETED | OUTPATIENT
Start: 2020-12-14 | End: 2020-12-14

## 2020-12-14 RX ORDER — MEPERIDINE HYDROCHLORIDE 25 MG/ML
12.5 INJECTION INTRAMUSCULAR; INTRAVENOUS; SUBCUTANEOUS EVERY 5 MIN PRN
Status: DISCONTINUED | OUTPATIENT
Start: 2020-12-14 | End: 2020-12-14 | Stop reason: HOSPADM

## 2020-12-14 RX ORDER — MORPHINE SULFATE 10 MG/ML
2 INJECTION, SOLUTION INTRAMUSCULAR; INTRAVENOUS EVERY 5 MIN PRN
Status: DISCONTINUED | OUTPATIENT
Start: 2020-12-14 | End: 2020-12-14 | Stop reason: HOSPADM

## 2020-12-14 RX ORDER — LIDOCAINE HYDROCHLORIDE 10 MG/ML
1 INJECTION, SOLUTION EPIDURAL; INFILTRATION; INTRACAUDAL; PERINEURAL
Status: DISCONTINUED | OUTPATIENT
Start: 2020-12-14 | End: 2020-12-14 | Stop reason: HOSPADM

## 2020-12-14 RX ORDER — SODIUM CHLORIDE, SODIUM LACTATE, POTASSIUM CHLORIDE, CALCIUM CHLORIDE 600; 310; 30; 20 MG/100ML; MG/100ML; MG/100ML; MG/100ML
INJECTION, SOLUTION INTRAVENOUS CONTINUOUS
Status: DISCONTINUED | OUTPATIENT
Start: 2020-12-14 | End: 2020-12-14 | Stop reason: HOSPADM

## 2020-12-14 RX ORDER — ONDANSETRON 2 MG/ML
4 INJECTION INTRAMUSCULAR; INTRAVENOUS PRN
Status: DISCONTINUED | OUTPATIENT
Start: 2020-12-14 | End: 2020-12-14 | Stop reason: HOSPADM

## 2020-12-14 RX ORDER — ONDANSETRON 2 MG/ML
INJECTION INTRAMUSCULAR; INTRAVENOUS PRN
Status: DISCONTINUED | OUTPATIENT
Start: 2020-12-14 | End: 2020-12-14 | Stop reason: SDUPTHER

## 2020-12-14 RX ORDER — OXYCODONE HYDROCHLORIDE AND ACETAMINOPHEN 5; 325 MG/1; MG/1
1 TABLET ORAL PRN
Status: COMPLETED | OUTPATIENT
Start: 2020-12-14 | End: 2020-12-14

## 2020-12-14 RX ORDER — MAGNESIUM HYDROXIDE 1200 MG/15ML
LIQUID ORAL CONTINUOUS PRN
Status: COMPLETED | OUTPATIENT
Start: 2020-12-14 | End: 2020-12-14

## 2020-12-14 RX ORDER — CEFAZOLIN SODIUM 2 G/50ML
SOLUTION INTRAVENOUS PRN
Status: DISCONTINUED | OUTPATIENT
Start: 2020-12-14 | End: 2020-12-14 | Stop reason: SDUPTHER

## 2020-12-14 RX ORDER — LABETALOL HYDROCHLORIDE 5 MG/ML
5 INJECTION, SOLUTION INTRAVENOUS EVERY 10 MIN PRN
Status: DISCONTINUED | OUTPATIENT
Start: 2020-12-14 | End: 2020-12-14 | Stop reason: HOSPADM

## 2020-12-14 RX ORDER — LIDOCAINE HYDROCHLORIDE 20 MG/ML
INJECTION, SOLUTION INFILTRATION; PERINEURAL PRN
Status: DISCONTINUED | OUTPATIENT
Start: 2020-12-14 | End: 2020-12-14 | Stop reason: SDUPTHER

## 2020-12-14 RX ORDER — BUPIVACAINE HYDROCHLORIDE 2.5 MG/ML
INJECTION, SOLUTION INFILTRATION; PERINEURAL PRN
Status: DISCONTINUED | OUTPATIENT
Start: 2020-12-14 | End: 2020-12-14 | Stop reason: ALTCHOICE

## 2020-12-14 RX ORDER — PROPOFOL 10 MG/ML
INJECTION, EMULSION INTRAVENOUS PRN
Status: DISCONTINUED | OUTPATIENT
Start: 2020-12-14 | End: 2020-12-14 | Stop reason: SDUPTHER

## 2020-12-14 RX ORDER — SODIUM CHLORIDE 0.9 % (FLUSH) 0.9 %
10 SYRINGE (ML) INJECTION PRN
Status: DISCONTINUED | OUTPATIENT
Start: 2020-12-14 | End: 2020-12-14 | Stop reason: HOSPADM

## 2020-12-14 RX ORDER — SODIUM CHLORIDE 0.9 % (FLUSH) 0.9 %
10 SYRINGE (ML) INJECTION EVERY 12 HOURS SCHEDULED
Status: DISCONTINUED | OUTPATIENT
Start: 2020-12-14 | End: 2020-12-14 | Stop reason: HOSPADM

## 2020-12-14 RX ORDER — FENTANYL CITRATE 50 UG/ML
INJECTION, SOLUTION INTRAMUSCULAR; INTRAVENOUS PRN
Status: DISCONTINUED | OUTPATIENT
Start: 2020-12-14 | End: 2020-12-14 | Stop reason: SDUPTHER

## 2020-12-14 RX ORDER — MORPHINE SULFATE 10 MG/ML
1 INJECTION, SOLUTION INTRAMUSCULAR; INTRAVENOUS EVERY 5 MIN PRN
Status: DISCONTINUED | OUTPATIENT
Start: 2020-12-14 | End: 2020-12-14 | Stop reason: HOSPADM

## 2020-12-14 RX ADMIN — HYDROMORPHONE HYDROCHLORIDE 0.5 MG: 1 INJECTION, SOLUTION INTRAMUSCULAR; INTRAVENOUS; SUBCUTANEOUS at 08:55

## 2020-12-14 RX ADMIN — FENTANYL CITRATE 100 MCG: 50 INJECTION INTRAMUSCULAR; INTRAVENOUS at 08:05

## 2020-12-14 RX ADMIN — DEXAMETHASONE SODIUM PHOSPHATE 8 MG: 4 INJECTION, SOLUTION INTRAMUSCULAR; INTRAVENOUS at 08:09

## 2020-12-14 RX ADMIN — ONDANSETRON 4 MG: 2 INJECTION, SOLUTION INTRAMUSCULAR; INTRAVENOUS at 08:09

## 2020-12-14 RX ADMIN — PROPOFOL 200 MG: 10 INJECTION, EMULSION INTRAVENOUS at 08:06

## 2020-12-14 RX ADMIN — LIDOCAINE HYDROCHLORIDE 100 MG: 20 INJECTION, SOLUTION INFILTRATION; PERINEURAL at 08:05

## 2020-12-14 RX ADMIN — CEFAZOLIN SODIUM 2 G: 2 SOLUTION INTRAVENOUS at 08:01

## 2020-12-14 RX ADMIN — SODIUM CHLORIDE, POTASSIUM CHLORIDE, SODIUM LACTATE AND CALCIUM CHLORIDE: 600; 310; 30; 20 INJECTION, SOLUTION INTRAVENOUS at 08:01

## 2020-12-14 RX ADMIN — OXYCODONE HYDROCHLORIDE AND ACETAMINOPHEN 1 TABLET: 5; 325 TABLET ORAL at 09:15

## 2020-12-14 ASSESSMENT — PULMONARY FUNCTION TESTS
PIF_VALUE: 11
PIF_VALUE: 1
PIF_VALUE: 11
PIF_VALUE: 10
PIF_VALUE: 4
PIF_VALUE: 8
PIF_VALUE: 1
PIF_VALUE: 11
PIF_VALUE: 11
PIF_VALUE: 10
PIF_VALUE: 11
PIF_VALUE: 8
PIF_VALUE: 11
PIF_VALUE: 1
PIF_VALUE: 1
PIF_VALUE: 11
PIF_VALUE: 2
PIF_VALUE: 0
PIF_VALUE: 11
PIF_VALUE: 1

## 2020-12-14 ASSESSMENT — PAIN SCALES - GENERAL
PAINLEVEL_OUTOF10: 2
PAINLEVEL_OUTOF10: 5
PAINLEVEL_OUTOF10: 2
PAINLEVEL_OUTOF10: 2
PAINLEVEL_OUTOF10: 5
PAINLEVEL_OUTOF10: 4

## 2020-12-14 ASSESSMENT — PAIN - FUNCTIONAL ASSESSMENT: PAIN_FUNCTIONAL_ASSESSMENT: 0-10

## 2020-12-14 ASSESSMENT — ENCOUNTER SYMPTOMS: SHORTNESS OF BREATH: 1

## 2020-12-14 NOTE — PROGRESS NOTES
Phase II:  1. Patient is identified using name and the date of birth. 2.  The patient is free from signs and symptoms of chemical, electrical, laser, radiation, positioning, or transfer/transport injury. 3.  The patient receives appropriate medication(s), safely administered during the Perioperative period. 4.  The patient has wound/tissue perfusion consistent with or improved from baseline levels established preoperatively. 5.  The patient is at or returning to normothermia at the conclusion of the immediate postoperative period. 6.  The patient's fluid, electrolyte, and acid base balances are consistent with or improved from baseline levels established preoperatively. 7.  The patient's pulmonary function is consistent with or improved from baseline levels established preoperatively. 8.  The patient's cardiovascular status is consistent with or improved from baseline levels established preoperatively. 9.  The patient/caregiver demonstrates knowledge of nutritional management related to the operative or other invasive procedure. 10. The patient/caregiver demonstrates knowledge of medication, pain, and wound management. 11. The patient participates in the rehabilitation process as applicable. 12.  The patient/caregiver participates in decisions affection his or her Perioperative plan of care. 13.  The patient's care is consistent with the individualized Perioperative plan of care. 14.  The patient's right to privacy is maintained. 15. The patient is the recipient of competent and ethical care within legal standards of practice. 16.  The patient's value system, lifestyle, ethnicity, and culture are considered, respected, and incorporated in the Perioperative plan of care and understands special services available. 17.  The patient demonstrates and/or reports adequate pain control throughout the the Perioperative period. 18.  The patient's neurological status is consistent with or improved from baseline levels established preoperatively. 23.  The patient/caregiver demonstrates knowledge of the expected responses to the operative or invasive procedure. 20.  Patient/Caregiver has reduced anxiety. Interventions- Familiarize with environment and equipment.   21. Other:  22. Other:

## 2020-12-14 NOTE — PROGRESS NOTES
Pre-Operative:  1. Patient/Caregiver identifies - states name and date of birth. 2.  The patient is free from signs and symptoms of injury. 3.  The patient receives appropriate medication(s), safely administered during the Perioperative period. 4.  The patient is free from signs and symptoms of infection. 5.  The patient has wound / tissue perfusion. 6.  The patients's fluid, electrolyte, and acid-base balances are established preoperatively. 7.  The patient's pulmonary function is established preoperatively. 8.  The patient's cardiovascular status is established preoperatively. 9.  The patient / caregiver demonstrates knowledge of nutritional management related to the operative or other invasive procedure. 10. The patient/caregiver demonstrates knowledge of medication management. 11. The patient/caregiver demonstrates knowledge of pain management. 12.  The patient participates in the rehabilitation process as applicable. 13.  The patient/caregiver participates in decisions affection his or her Perioperative plan of care. 14.  The patient's care is consistent with the individualized Perioperative plan of care. 15.  The patient's right to privacy is maintained. 16.  The patient is the recipient of competent and ethical care within legal standards of practice. 17.  The patient's value system, lifestyle, ethnicity, and culture are considered, respected, and incorporated in the Perioperative plan of care and understands special services available. 18.  The patient demonstrates and/or reports adequate pain control throughout the the Perioperative period. 19. The patient's neurological status is established preoperatively. 20. The patient/caregiver demonstrates knowledge of the expected responses to the operative or invasive procedure. 21.  Patient/Caregiver has reduced anxiety. Interventions- Familiarize with environment and equipment. 22. Patient/Caregiver verbalizes understanding of Phase I and Phase II process. 23.  Patient pain level is established preoperatively using age appropriate pain scale. 24.  The patient will move to fall risk upon sedation- during and through the recovery phase. Interventions- orient the patient to the environment, especially the location of the bathroom; provide treaded socks/non-skid footwear; demonstrate and teach back use of the nurse's call system; instruct the patient to call for help before getting out of bed; lock all movable equipment before transferring patient; keep bed in lowest position possible.  25.  Other:

## 2020-12-14 NOTE — OP NOTE
[address]                 [address] [address]                                OPERATIVE REPORT    PATIENT NAME: SOPHIE FORDE                    :        1965  MED REC NO:   [id number]                          ROOM:  ACCOUNT NO:   [de-identified]                           ADMIT DATE: 2020  PROVIDER:     Nikhil Saez MD    DATE OF PROCEDURE:  2020    PREOPERATIVE DIAGNOSIS:  Right knee medial meniscus tear. POSTOPERATIVE DIAGNOSIS:  Right knee medial meniscus tear. OPERATION PERFORMED:  Right knee video arthroscopy with partial medial  meniscectomy. SURGEON:  Nikhil Saez MD.    ANESTHESIA:  General.    BLOOD LOSS:  Less than 5 mL. COMPLICATIONS:  None noted. INDICATIONS FOR OPERATIVE:  This is a 30-year-old male, who presented  with history, exam, and testing consistent with medial meniscus tear and  after failure of all other modalities, elected for the recommendation of  surgical intervention. DESCRIPTION OF OPERATION:  The patient was taken to the operating room  where a general anesthetic was obtained. Antibiotics were given IV  piggyback preoperatively. The leg was placed in leg howard and  sterilely prepped and draped, and a two-port arthroscopy of the right  knee was carried out in usual fashion using a 30-degree arthroscope. Upon entry into the knee, he was noted to have a normal patellofemoral  articulation. He had proliferative synovitis and fibrotic medial plica  with some excoriation along the medial femoral condyle. He had a  complex tear of the posterior half of the medial meniscus. The ACL,  PCL, and lateral compartment were free of pathology. A partial medial  meniscectomy was performed with both rigid and power instrumentation,  leaving well-contoured and smooth inner rim. The medial plica and the  surrounding synovitis was debrided by power debrider.   The knee was then thoroughly irrigated and evacuated of all loose debris and instilled  with 20 mL of 0.25% Marcaine plain. The port was repaired with 4-0  nylon in a figure-of-eight fashion. The knee was dressed with  Steri-Strips, dressings, sponge, ABDs, Webril, and Ace wrap. He  appeared to tolerate the procedure well and will be taken to the  recovery room when stable. Less than 5 mL blood loss and no noted  complications.         Frantz Suresh MD    D: 12/14/2020 8:37:28       T: 12/14/2020 12:05:06     [email or web address]  Job#: [id number]     Doc#: [id number]    CC:

## 2020-12-14 NOTE — BRIEF OP NOTE
Brief Postoperative Note      Patient: Minor Macdonald  YOB: 1965  MRN: [id number]    Date of Procedure: 12/14/2020    Pre-Op Diagnosis: RIGHT KNEE MEDIAL MENISCUS TEAR    Post-Op Diagnosis: Same       Procedure(s):  RIGHT KNEE VIDEO ARTHROSCOPY, MEDIAL MENISECTOMY    Surgeon(s):  Cyril Mohs, MD    Assistant:  Surgical Assistant: Peter Romo    Anesthesia: General    Estimated Blood Loss (mL): Minimal    Complications: None    Specimens:   * No specimens in log *    Implants:  * No implants in log *      Drains: * No LDAs found *    Findings: carlos    Electronically signed by Cyril Mohs, MD on 12/14/2020 at 8:22 AM

## 2020-12-14 NOTE — ANESTHESIA PRE PROCEDURE
Department of Anesthesiology  Preprocedure Note       Name:  Nathalie Feliciano   Age:  54 y.o.  :  1965                                          MRN:  [id number]         Date:  2020      Surgeon: Samira Babcock):  Laureen Owens MD    Procedure: Procedure(s):  RIGHT KNEE VIDEO ARTHROSCOPY, MEDIAL MENISECTOMY    Medications prior to admission:   Prior to Admission medications    Medication Sig Start Date End Date Taking? Authorizing Provider   Ginkgo Biloba 40 MG TABS Take 120 mg by mouth daily   Yes Historical Provider, MD   meloxicam (MOBIC) 15 MG tablet Take 1 tablet by mouth daily 20  Yes JALEN Tobar CNP   lisinopril (PRINIVIL;ZESTRIL) 10 MG tablet Take 1 tablet by mouth daily 20  Yes JALEN Tobar CNP   methocarbamol (ROBAXIN) 750 MG tablet Take 1 tablet by mouth 4 times daily  Patient taking differently: Take 750 mg by mouth 4 times daily as needed  19  Yes Polly Brown MD   omeprazole (PRILOSEC) 20 MG delayed release capsule TAKE 1 CAPSULE BY MOUTH  TWICE DAILY  Patient taking differently: Take 20 mg by mouth Daily  10/25/19  Yes Polly Brown MD   ascorbic acid (VITAMIN C) 500 MG tablet Take 500 mg by mouth daily.    Yes Historical Provider, MD   Potassium Gluconate 550 MG TABS Take 550 mg by mouth 2 times daily    Yes Historical Provider, MD       Current medications:    Current Facility-Administered Medications   Medication Dose Route Frequency Provider Last Rate Last Admin    lactated ringers infusion   Intravenous Continuous Victoria Cartagena MD        sodium chloride flush 0.9 % injection 10 mL  10 mL Intravenous 2 times per day Victoria Cartagena MD        sodium chloride flush 0.9 % injection 10 mL  10 mL Intravenous PRN Victoria Cartagena MD        lidocaine PF 1 % injection 1 mL  1 mL Intradermal Once PRN Victoria Cartagena MD        ceFAZolin (ANCEF) 2 g in sterile water 20 mL IV syringe  2 g Intravenous Once Laureen Owens MD Component Value Date    LABABO O%POS^^POSITIVE 07/15/2014       Drug/Infectious Status (If Applicable):  No results found for: HIV, HEPCAB    COVID-19 Screening (If Applicable):   Lab Results   Component Value Date    COVID19 Not Detected 12/08/2020    COVID19 NOT DETECTED 11/03/2020         Anesthesia Evaluation  Patient summary reviewed and Nursing notes reviewed  Airway: Mallampati: III  TM distance: >3 FB   Neck ROM: full  Mouth opening: > = 3 FB Dental:    (+) upper dentures and lower dentures      Pulmonary:normal exam  breath sounds clear to auscultation  (+) shortness of breath:  sleep apnea:                             Cardiovascular:    (+) hypertension:, DAMICO:,         Rhythm: regular  Rate: normal                    Neuro/Psych:   Negative Neuro/Psych ROS              GI/Hepatic/Renal:   (+) GERD:,           Endo/Other:    (+) : arthritis: OA., .                 Abdominal:   (+) obese,         Vascular: negative vascular ROS. Anesthesia Plan      general     ASA 3       Induction: intravenous. MIPS: Postoperative opioids intended and Prophylactic antiemetics administered. Anesthetic plan and risks discussed with patient. Plan discussed with CRNA.                   Mahogany Greer MD   12/14/2020

## 2020-12-14 NOTE — H&P
Update History & Physical    The patient's History and Physical was reviewed with the patient and I examined the patient. There was no change. The surgical site was confirmed by the patient and me. Plan: The risks, benefits, expected outcome, and alternative to the recommended procedure have been discussed with the patient. Patient understands and wants to proceed with the procedure.      Electronically signed by Larisa Seymour MD on 12/14/2020 at 7:59 AM

## 2020-12-14 NOTE — PROGRESS NOTES
Instructions given to travis. She verbalizes understanding. pt escorted to car via w/c in good condition.

## 2020-12-22 ENCOUNTER — OFFICE VISIT (OUTPATIENT)
Dept: SURGERY | Age: 55
End: 2020-12-22

## 2020-12-22 ENCOUNTER — OFFICE VISIT (OUTPATIENT)
Dept: ORTHOPEDIC SURGERY | Age: 55
End: 2020-12-22

## 2020-12-22 VITALS
SYSTOLIC BLOOD PRESSURE: 120 MMHG | DIASTOLIC BLOOD PRESSURE: 76 MMHG | BODY MASS INDEX: 31.06 KG/M2 | HEIGHT: 74 IN | TEMPERATURE: 97.5 F | WEIGHT: 242 LBS

## 2020-12-22 PROCEDURE — 99024 POSTOP FOLLOW-UP VISIT: CPT | Performed by: ORTHOPAEDIC SURGERY

## 2020-12-22 PROCEDURE — 99024 POSTOP FOLLOW-UP VISIT: CPT | Performed by: SURGERY

## 2020-12-22 NOTE — PROGRESS NOTES
FOLLOW-UP VISIT      The patient returns for follow-up s/p RIGHT KNEE VIDEO ARTHROSCOPY, MEDIAL MENISECTOMY       Date of Surgery    12/14/2020    Wound Status    Sutures Removed, Incisions are healing well with no surrounding erythema, and minimal ecchymosis. Exam    He is doing well with no signs of infection DVT minimal swelling. He says he is walking and doing much better than he thought he would and much better than he did when he had this years ago when he was on crutches for 3 weeks after surgery.     Plan    Slow return to normal activity    Follow-up Appointment    4 weeks as needed

## 2020-12-22 NOTE — PROGRESS NOTES
[facility] GENERAL SURGERY      S:   Patient presents s/p umbilical hernia repair. He reports the wound cracked open and is not healing properly. O:   Comfortable         Incision site healing well except for a small superficial dehiscence at the right lateral aspect where he is spitting a stitch. This was removed. A:   S/P umbilical hernia repair    P:   Local care instructions given. Follow up as needed.

## 2021-01-01 DIAGNOSIS — M19.90 ARTHRITIS: ICD-10-CM

## 2021-01-04 RX ORDER — MELOXICAM 15 MG/1
15 TABLET ORAL DAILY
Qty: 90 TABLET | Refills: 3 | Status: SHIPPED | OUTPATIENT
Start: 2021-01-04 | End: 2021-11-02

## 2021-05-31 DIAGNOSIS — G89.29 CHRONIC LOW BACK PAIN, UNSPECIFIED BACK PAIN LATERALITY, UNSPECIFIED WHETHER SCIATICA PRESENT: ICD-10-CM

## 2021-05-31 DIAGNOSIS — M54.50 CHRONIC LOW BACK PAIN, UNSPECIFIED BACK PAIN LATERALITY, UNSPECIFIED WHETHER SCIATICA PRESENT: ICD-10-CM

## 2021-06-01 RX ORDER — METHOCARBAMOL 750 MG/1
750 TABLET, FILM COATED ORAL 4 TIMES DAILY
Qty: 120 TABLET | Refills: 0 | Status: SHIPPED | OUTPATIENT
Start: 2021-06-01

## 2021-06-12 DIAGNOSIS — I10 ESSENTIAL HYPERTENSION: ICD-10-CM

## 2021-06-14 RX ORDER — LISINOPRIL 10 MG/1
10 TABLET ORAL DAILY
Qty: 90 TABLET | Refills: 3 | Status: SHIPPED | OUTPATIENT
Start: 2021-06-14 | End: 2022-08-08

## 2021-08-15 DIAGNOSIS — K21.9 GASTROESOPHAGEAL REFLUX DISEASE WITHOUT ESOPHAGITIS: ICD-10-CM

## 2021-08-16 RX ORDER — OMEPRAZOLE 20 MG/1
CAPSULE, DELAYED RELEASE ORAL
Qty: 180 CAPSULE | Refills: 1 | Status: SHIPPED | OUTPATIENT
Start: 2021-08-16 | End: 2021-12-27

## 2021-11-01 DIAGNOSIS — M19.90 ARTHRITIS: ICD-10-CM

## 2021-11-02 RX ORDER — MELOXICAM 15 MG/1
15 TABLET ORAL DAILY
Qty: 90 TABLET | Refills: 3 | Status: SHIPPED | OUTPATIENT
Start: 2021-11-02

## 2021-12-26 DIAGNOSIS — K21.9 GASTROESOPHAGEAL REFLUX DISEASE WITHOUT ESOPHAGITIS: ICD-10-CM

## 2021-12-27 RX ORDER — OMEPRAZOLE 20 MG/1
CAPSULE, DELAYED RELEASE ORAL
Qty: 180 CAPSULE | Refills: 3 | Status: SHIPPED | OUTPATIENT
Start: 2021-12-27

## 2022-07-24 ENCOUNTER — PATIENT MESSAGE (OUTPATIENT)
Dept: FAMILY MEDICINE CLINIC | Age: 57
End: 2022-07-24

## 2022-07-25 NOTE — TELEPHONE ENCOUNTER
From: Le Cor[place]  To: Dr. Debria Cranker: 7/24/2022 10:37 AM EDT  Subject: Refill needed    I was prescribed sildenafil 20 mg back in 2019 by Dr Henry Lugo and I would like to get a refill on these for qty. 100 tablets. Would this require a office or virtual visit? Also I would like to get a paper prescription so I could use a location that offers \"Good Rx\" discounts.

## 2022-08-03 ENCOUNTER — OFFICE VISIT (OUTPATIENT)
Dept: FAMILY MEDICINE CLINIC | Age: 57
End: 2022-08-03
Payer: COMMERCIAL

## 2022-08-03 VITALS
BODY MASS INDEX: 31.95 KG/M2 | HEIGHT: 74 IN | SYSTOLIC BLOOD PRESSURE: 112 MMHG | WEIGHT: 249 LBS | OXYGEN SATURATION: 97 % | HEART RATE: 71 BPM | DIASTOLIC BLOOD PRESSURE: 64 MMHG

## 2022-08-03 DIAGNOSIS — Z00.00 ENCOUNTER FOR WELL ADULT EXAM WITHOUT ABNORMAL FINDINGS: Primary | ICD-10-CM

## 2022-08-03 DIAGNOSIS — Z00.00 ENCOUNTER FOR WELL ADULT EXAM WITHOUT ABNORMAL FINDINGS: ICD-10-CM

## 2022-08-03 LAB
A/G RATIO: 1.7 (ref 1.1–2.2)
ALBUMIN SERPL-MCNC: 4.5 G/DL (ref 3.4–5)
ALP BLD-CCNC: 80 U/L (ref 40–129)
ALT SERPL-CCNC: 33 U/L (ref 10–40)
ANION GAP SERPL CALCULATED.3IONS-SCNC: 11 MMOL/L (ref 3–16)
AST SERPL-CCNC: 24 U/L (ref 15–37)
BILIRUB SERPL-MCNC: 0.4 MG/DL (ref 0–1)
BUN BLDV-MCNC: 15 MG/DL (ref 7–20)
CALCIUM SERPL-MCNC: 9.7 MG/DL (ref 8.3–10.6)
CHLORIDE BLD-SCNC: 104 MMOL/L (ref 99–110)
CHOLESTEROL, TOTAL: 166 MG/DL (ref 0–199)
CO2: 25 MMOL/L (ref 21–32)
CREAT SERPL-MCNC: 1 MG/DL (ref 0.9–1.3)
GFR AFRICAN AMERICAN: >60
GFR NON-AFRICAN AMERICAN: >60
GLUCOSE BLD-MCNC: 108 MG/DL (ref 70–99)
HDLC SERPL-MCNC: 32 MG/DL (ref 40–60)
LDL CHOLESTEROL CALCULATED: 108 MG/DL
POTASSIUM SERPL-SCNC: 5 MMOL/L (ref 3.5–5.1)
PROSTATE SPECIFIC ANTIGEN: 0.44 NG/ML (ref 0–4)
SODIUM BLD-SCNC: 140 MMOL/L (ref 136–145)
TOTAL PROTEIN: 7.1 G/DL (ref 6.4–8.2)
TRIGL SERPL-MCNC: 129 MG/DL (ref 0–150)
VLDLC SERPL CALC-MCNC: 26 MG/DL

## 2022-08-03 PROCEDURE — 99396 PREV VISIT EST AGE 40-64: CPT | Performed by: NURSE PRACTITIONER

## 2022-08-03 RX ORDER — SILDENAFIL CITRATE 20 MG/1
20 TABLET ORAL DAILY PRN
Qty: 30 TABLET | Refills: 0 | Status: SHIPPED | OUTPATIENT
Start: 2022-08-03

## 2022-08-03 RX ORDER — SILDENAFIL CITRATE 20 MG/1
TABLET ORAL
COMMUNITY
Start: 2019-01-18 | End: 2022-08-03 | Stop reason: SDUPTHER

## 2022-08-03 ASSESSMENT — PATIENT HEALTH QUESTIONNAIRE - PHQ9
2. FEELING DOWN, DEPRESSED OR HOPELESS: 0
SUM OF ALL RESPONSES TO PHQ QUESTIONS 1-9: 0
SUM OF ALL RESPONSES TO PHQ9 QUESTIONS 1 & 2: 0
1. LITTLE INTEREST OR PLEASURE IN DOING THINGS: 0
SUM OF ALL RESPONSES TO PHQ QUESTIONS 1-9: 0

## 2022-08-03 ASSESSMENT — ENCOUNTER SYMPTOMS
RESPIRATORY NEGATIVE: 1
GASTROINTESTINAL NEGATIVE: 1

## 2022-08-03 NOTE — PROGRESS NOTES
Well Adult Note  Name: Bria Pacheco. Date: 8/3/2022   MRN: [id number] Sex: Male   Age: 62 y.o. Ethnicity: Non- / Non    : 1965 Race: White (non-)      Parker Walker is here for well adult exam.  History:    Patient presents today for a wellness exam.  He denies any questions or concerns today. Blood pressure is well controlled and denies any chest pain or headaches. Review of Systems   Constitutional: Negative. HENT: Negative. Respiratory: Negative. Cardiovascular: Negative. Gastrointestinal: Negative. Musculoskeletal: Negative. Skin: Negative. Neurological: Negative. Negative for dizziness and headaches. Psychiatric/Behavioral: Negative. Allergies   Allergen Reactions    No Known Allergies          Prior to Visit Medications    Medication Sig Taking? Authorizing Provider   sildenafil (REVATIO) 20 MG tablet Take 1 tablet by mouth daily as needed (ed) Yes JALEN Harding CNP   omeprazole (PRILOSEC) 20 MG delayed release capsule TAKE 1 CAPSULE BY MOUTH  TWICE DAILY Yes Abby Ellis MD   meloxicam (MOBIC) 15 MG tablet TAKE 1 TABLET BY MOUTH  DAILY Yes Abby Ellis MD   lisinopril (PRINIVIL;ZESTRIL) 10 MG tablet TAKE 1 TABLET BY MOUTH  DAILY Yes Abby Ellis MD   methocarbamol (ROBAXIN) 750 MG tablet Take 1 tablet by mouth 4 times daily Yes Abby Ellis MD   Ginkgo Biloba 40 MG TABS Take 120 mg by mouth daily Yes Historical Provider, MD   ascorbic acid (VITAMIN C) 500 MG tablet Take 500 mg by mouth daily.  Yes Historical Provider, MD   Potassium Gluconate 550 MG TABS Take 550 mg by mouth 2 times daily  Yes Historical Provider, MD         Past Medical History:   Diagnosis Date    Arthritis     Chronic back pain     GERD (gastroesophageal reflux disease)     HTN (hypertension)     Sleep apnea     mild-no longer uses CPAP       Past Surgical History:   Procedure Laterality Date    BACK SURGERY      x 2    CARPAL TUNNEL RELEASE Right     CERVICAL DISC SURGERY      COLONOSCOPY  1/28/16    COLONOSCOPY N/A 10/23/2020    COLON W/ANES. performed by Gissell Nickerson MD at [facility] ARTHROSCOPY Right     KNEE ARTHROSCOPY Right 12/14/2020    RIGHT KNEE VIDEO ARTHROSCOPY, MEDIAL MENISECTOMY performed by Benji Vick MD at [facility] Bilateral 2013    OTHER SURGICAL HISTORY       RIGHT KNEE VIDEO ARTHROSCOPY, MEDIAL MENISECTOMY (Right Knee)    TONSILLECTOMY      UMBILICAL HERNIA REPAIR N/A 00/4/6610    UMBILICAL HERNIA REPAIR WITH MESH -SLEEP APNEA- performed by Tiffanie Graves MD at [facility]  10/23/2020    and colonoscopy    UPPER GASTROINTESTINAL ENDOSCOPY N/A 10/23/2020    EGD W/ANES. (10:30) performed by Gissell Nickerson MD at [facility] END[facility]COPY         Family History   Problem Relation Age of Onset    High Blood Pressure Mother     Diabetes Mother     Kidney Disease Mother     Heart Disease Mother     Parkinsonism Father     Dementia Father     Cancer Father     Other Brother         motocycle accident       Social History     Tobacco Use    Smoking status: Former     Packs/day: 1.50     Years: 21.00     Pack years: 31.50     Types: Cigarettes     Quit date: 9/29/2006     Years since quitting: 15.8    Smokeless tobacco: Never   Vaping Use    Vaping Use: Never used   Substance Use Topics    Alcohol use: Yes     Comment: 2 drinks per week    Drug use: No       Objective   /64   Pulse 71   Ht 6' 2\" (1.88 m)   Wt 249 lb (112.9 kg)   SpO2 97%   BMI 31.97 kg/m²   Wt Readings from Last 3 Encounters:   08/03/22 249 lb (112.9 kg)   12/22/20 242 lb (109.8 kg)   12/14/20 240 lb (108.9 kg)     There were no vitals filed for this visit. Physical Exam  Vitals reviewed.    HENT:      Right Ear: Tympanic membrane, ear canal and external ear normal.      Left Ear: Tympanic membrane, ear canal and external ear normal.      Mouth/Throat:      Mouth: Mucous membranes are moist. Eyes:      Conjunctiva/sclera: Conjunctivae normal.      Pupils: Pupils are equal, round, and reactive to light. Cardiovascular:      Rate and Rhythm: Normal rate and regular rhythm. Heart sounds: Normal heart sounds. Pulmonary:      Effort: Pulmonary effort is normal.      Breath sounds: Normal breath sounds. Abdominal:      General: Bowel sounds are normal.   Skin:     General: Skin is warm and dry. Neurological:      Mental Status: He is alert and oriented to person, place, and time. Assessment   Plan   1. Encounter for well adult exam without abnormal findings  Doing well overall, see below for blood work. -     PSA Screening; Future  -     Lipid Panel; Future  -     Hemoglobin A1C; Future  -     Comprehensive Metabolic Panel;  Future       Personalized Preventive Plan   Current Health Maintenance Status  Immunization History   Administered Date(s) Administered    COVID-19, PFIZER PURPLE top, DILUTE for use, (age 15 y+), 30mcg/0.3mL 07/25/2021, 08/15/2021    Influenza Virus Vaccine 11/06/2017    Influenza, Quadv, IM, PF (6 mo and older Fluzone, Flulaval, Fluarix, and 3 yrs and older Afluria) 12/09/2020    Td vaccine (adult) 10/03/2007    Tdap (Boostrix, Adacel) 10/15/2017        Health Maintenance   Topic Date Due    A1C test (Diabetic or Prediabetic)  01/18/2020    Prostate Specific Antigen (PSA) Screening or Monitoring  01/18/2020    COVID-19 Vaccine (3 - Booster for Pfizer series) 01/15/2022    Flu vaccine (1) 09/01/2022    Depression Screen  08/03/2023    Lipids  09/16/2025    Colorectal Cancer Screen  10/23/2025    DTaP/Tdap/Td vaccine (3 - Td or Tdap) 10/15/2027    Shingles vaccine  Completed    Hepatitis C screen  Completed    HIV screen  Completed    Hepatitis A vaccine  Aged Out    Hepatitis B vaccine  Aged Out    Hib vaccine  Aged Out    Meningococcal (ACWY) vaccine  Aged Out    Pneumococcal 0-64 years Vaccine  Aged Out     Recommendations for Dtime Due: see orders and patient instructions/AVS.

## 2022-08-04 LAB
ESTIMATED AVERAGE GLUCOSE: 122.6 MG/DL
HBA1C MFR BLD: 5.9 %

## 2022-08-06 DIAGNOSIS — I10 ESSENTIAL HYPERTENSION: ICD-10-CM

## 2022-08-08 RX ORDER — LISINOPRIL 10 MG/1
10 TABLET ORAL DAILY
Qty: 90 TABLET | Refills: 3 | Status: SHIPPED | OUTPATIENT
Start: 2022-08-08

## 2022-08-25 ENCOUNTER — OFFICE VISIT (OUTPATIENT)
Dept: FAMILY MEDICINE CLINIC | Age: 57
End: 2022-08-25
Payer: COMMERCIAL

## 2022-08-25 VITALS
BODY MASS INDEX: 31.71 KG/M2 | SYSTOLIC BLOOD PRESSURE: 120 MMHG | HEART RATE: 71 BPM | DIASTOLIC BLOOD PRESSURE: 82 MMHG | WEIGHT: 247 LBS | OXYGEN SATURATION: 97 %

## 2022-08-25 DIAGNOSIS — M75.121 NONTRAUMATIC COMPLETE TEAR OF RIGHT ROTATOR CUFF: ICD-10-CM

## 2022-08-25 DIAGNOSIS — M25.511 CHRONIC RIGHT SHOULDER PAIN: ICD-10-CM

## 2022-08-25 DIAGNOSIS — G89.29 CHRONIC RIGHT SHOULDER PAIN: ICD-10-CM

## 2022-08-25 DIAGNOSIS — Z01.818 PREOP EXAMINATION: Primary | ICD-10-CM

## 2022-08-25 PROCEDURE — G8427 DOCREV CUR MEDS BY ELIG CLIN: HCPCS | Performed by: NURSE PRACTITIONER

## 2022-08-25 PROCEDURE — G8417 CALC BMI ABV UP PARAM F/U: HCPCS | Performed by: NURSE PRACTITIONER

## 2022-08-25 PROCEDURE — 99212 OFFICE O/P EST SF 10 MIN: CPT | Performed by: NURSE PRACTITIONER

## 2022-08-25 NOTE — PROGRESS NOTES
[facility]  [phone]  Fax: [phone]   Pre-operative History and Physical      DIAGNOSIS: right shoulder pain, right torn rotator cuff     PROCEDURE:  right shoulder rotator full tear, debridement, decompression, bicep tendonesis      History Obtained From:  patient    HISTORY OF PRESENT ILLNESS:    The patient is a 62 y.o. male with significant past medical history of chronic right shoulder pain with tear of right rotator cuff. I am seeing this patient for preop consultation for Dr. Connie Albright       Past Medical History:   Diagnosis Date    Arthritis     Chronic back pain     GERD (gastroesophageal reflux disease)     HTN (hypertension)     Sleep apnea     mild-no longer uses CPAP     Past Surgical History:   Procedure Laterality Date    BACK SURGERY      x 2    CARPAL TUNNEL RELEASE Right     CERVICAL DISC SURGERY      COLONOSCOPY  1/28/16    COLONOSCOPY N/A 10/23/2020    COLON W/ANES. performed by Arnold Campa MD at [facility] ARTHROSCOPY Right     KNEE ARTHROSCOPY Right 12/14/2020    RIGHT KNEE VIDEO ARTHROSCOPY, MEDIAL MENISECTOMY performed by Moses Peacock MD at [address] [place] [address] Bilateral 2013    OTHER SURGICAL HISTORY       RIGHT KNEE VIDEO ARTHROSCOPY, MEDIAL MENISECTOMY (Right Knee)    TONSILLECTOMY      UMBILICAL HERNIA REPAIR N/A 97/4/3581    UMBILICAL HERNIA REPAIR WITH MESH -SLEEP APNEA- performed by Marcos Cardenas MD at [facility]  10/23/2020    and colonoscopy    UPPER GASTROINTESTINAL ENDOSCOPY N/A 10/23/2020    EGD W/ANES.  (10:30) performed by Arnold Campa MD at [facility] ENDOSCOPY     Current Outpatient Medications   Medication Sig Dispense Refill    lisinopril (PRINIVIL;ZESTRIL) 10 MG tablet TAKE 1 TABLET BY MOUTH  DAILY 90 tablet 3    sildenafil (REVATIO) 20 MG tablet Take 1 tablet by mouth daily as needed (ed) 30 tablet 0    omeprazole (PRILOSEC) 20 MG delayed release capsule TAKE 1 CAPSULE BY MOUTH  TWICE DAILY 180 capsule 3    meloxicam (MOBIC) 15 MG tablet TAKE 1 TABLET BY MOUTH  DAILY 90 tablet 3    methocarbamol (ROBAXIN) 750 MG tablet Take 1 tablet by mouth 4 times daily 120 tablet 0    Ginkgo Biloba 40 MG TABS Take 120 mg by mouth daily      ascorbic acid (VITAMIN C) 500 MG tablet Take 500 mg by mouth daily. Potassium Gluconate 550 MG TABS Take 550 mg by mouth 2 times daily        No current facility-administered medications for this visit. Allergies:  No known allergies  History of allergic reaction to anesthesia:  No     Social History     Tobacco Use   Smoking Status Former    Packs/day: 1.50    Years: 21.00    Pack years: 31.50    Types: Cigarettes    Quit date: 9/29/2006    Years since quitting: 15.9   Smokeless Tobacco Never     The patient states he drinks 2 per week.     Family History   Problem Relation Age of Onset    High Blood Pressure Mother     Diabetes Mother     Kidney Disease Mother     Heart Disease Mother     Parkinsonism Father     Dementia Father     Cancer Father     Other Brother         motocycle accident       REVIEW OF SYSTEMS:    CONSTITUTIONAL:  negative  EYES:  negative  HEENT:  negative  RESPIRATORY:  negative  CARDIOVASCULAR:  negative  GASTROINTESTINAL:  negative  GENITOURINARY:  negative  INTEGUMENT/BREAST:  negative  HEMATOLOGIC/LYMPHATIC:  negative  ALLERGIC/IMMUNOLOGIC:  negative  ENDOCRINE:  negative  MUSCULOSKELETAL:  positive for  pain in shoulders  NEUROLOGICAL:  negative    PHYSICAL EXAM:      /82   Pulse 71   Wt 247 lb (112 kg)   SpO2 97%   BMI 31.71 kg/m²     CONSTITUTIONAL:  awake, alert, cooperative, no apparent distress, and appears stated age    Eyes:  Lids and lashes normal, pupils equal, round and reactive to light, extra ocular muscles intact, sclera clear, conjunctiva normal    Head/ENT:  Normocephalic, without obvious abnormality, atramatic, sinuses nontender on palpation, external ears without lesions, oral pharynx with moist mucus membranes, tonsils without erythema or exudates, upper and lower dentures     Neck:  Supple, symmetrical, trachea midline, no adenopathy, thyroid symmetric, not enlarged and no tenderness, skin normal    Heart:  Normal apical impulse, regular rate and rhythm, normal S1 and S2, no S3 or S4, and no murmur noted    Lungs:  No increased work of breathing, good air exchange, clear to auscultation bilaterally, no crackles or wheezing    Abdomen:  No scars, normal bowel sounds, soft, non-distended, non-tender, no masses palpated, no hepatosplenomegally    Extremities:  No clubbing, cyanosis, or edema, limited ROM right shoulder    NEUROLOGIC:  Awake, alert, oriented to name, place and time. Cranial nerves II-XII are grossly intact. Motor is 5 out of 5 bilaterally. ASSESSMENT AND PLAN:    1. Patient is a 62 y.o. male with above specified procedure planned on 9/16/2022 with Dr. Wenceslao Yee at [facility]. They will not require cardiology evaluation prior to procedure. 2. Stop ASA/NSAIDS medications 7-10 days prior to procedure. 3.Patient is cleared for surgery  4. Preop has been faxed to Dr. Nilam Goldberg office    Adelle Peabody, APRN - [id number] [address], [place]  [address] [facility] [address] [phone]

## 2022-10-20 ENCOUNTER — PATIENT MESSAGE (OUTPATIENT)
Dept: FAMILY MEDICINE CLINIC | Age: 57
End: 2022-10-20

## 2022-10-20 RX ORDER — SILDENAFIL 50 MG/1
50 TABLET, FILM COATED ORAL PRN
Qty: 90 TABLET | Refills: 0 | Status: SHIPPED | OUTPATIENT
Start: 2022-10-20

## 2022-10-20 NOTE — LETTER
[address]  [address] [facility] [address] [place] [address]  Phone: [phone]  Fax: [phone]    Kale Calabrese MD         October 20, 2022     Patient: Veronique Tobar   YOB: 1965   Date of Visit: 10/20/2022       To Whom It May Concern: It is my medical opinion that JAYDE Hammonds requires a disability parking placard for the following reasons:  He cannot walk without assistance from another person or the use of an assistance device (cane, crutch, prosthetic device, wheelchair, etc.). Duration of need: 1 year    If you have any questions or concerns, please don't hesitate to call.     Sincerely,        Kale Calabrese MD

## 2022-10-20 NOTE — TELEPHONE ENCOUNTER
From: Kulwant Marie  To: Dr. Darby End: 10/20/2022 9:01 AM EDT  Subject: Handicap renewal and refill changes    I would like to get a written prescription to renew my disability placard and also I would like to get the strength and quantity changed on my prescription of Sildenafil. I currently take 2 or 3 tablets of 20 mg but I wanted to see if I could get the 50 mg and only take 1 tablet. Also I would like to see if I could get the prescription for 90 tablets to avoid going after refills so often. I would like a written prescription for the Sildenafil refills so I can go to other pharmacy due to [facility] having high prices on this now.

## 2022-10-21 ENCOUNTER — TELEPHONE (OUTPATIENT)
Dept: FAMILY MEDICINE CLINIC | Age: 57
End: 2022-10-21

## 2022-10-21 NOTE — TELEPHONE ENCOUNTER
Pharmacy called on clarification on the medication  sildenafil (VIAGRA) 50 MG tablet  They needed quantity of max per day. Kevan Foster said 1 per day.

## 2022-11-10 DIAGNOSIS — M19.90 ARTHRITIS: ICD-10-CM

## 2022-11-10 RX ORDER — MELOXICAM 15 MG/1
15 TABLET ORAL DAILY
Qty: 90 TABLET | Refills: 3 | Status: SHIPPED | OUTPATIENT
Start: 2022-11-10

## 2022-11-13 DIAGNOSIS — K21.9 GASTROESOPHAGEAL REFLUX DISEASE WITHOUT ESOPHAGITIS: ICD-10-CM

## 2022-11-14 RX ORDER — OMEPRAZOLE 20 MG/1
CAPSULE, DELAYED RELEASE ORAL
Qty: 180 CAPSULE | Refills: 3 | Status: SHIPPED | OUTPATIENT
Start: 2022-11-14

## 2023-01-17 ENCOUNTER — PATIENT MESSAGE (OUTPATIENT)
Dept: FAMILY MEDICINE CLINIC | Age: 58
End: 2023-01-17

## 2023-01-17 DIAGNOSIS — I10 ESSENTIAL HYPERTENSION: ICD-10-CM

## 2023-01-17 DIAGNOSIS — M19.90 ARTHRITIS: ICD-10-CM

## 2023-01-17 RX ORDER — MELOXICAM 15 MG/1
15 TABLET ORAL DAILY
Qty: 90 TABLET | Refills: 3 | Status: SHIPPED | OUTPATIENT
Start: 2023-01-17

## 2023-01-17 RX ORDER — LISINOPRIL 10 MG/1
10 TABLET ORAL DAILY
Qty: 90 TABLET | Refills: 3 | Status: SHIPPED | OUTPATIENT
Start: 2023-01-17

## 2023-01-17 NOTE — TELEPHONE ENCOUNTER
From: Ellen Juárez  To: Dr. David Munroe: 1/17/2023 10:05 AM EST  Subject: Transfer refills to new Insurance plan    Hi, I have changed insurance provider and I need to get my prescriptions transferred to assure that I don't run out. The prescriptions needed are:  Lisinopril Tab 10 mg, 1 a day, 90 day supply. Meloxicam Tab 15mg, 1 a day, 90 day supply. I do not need the Omeprazole or Methocarbamol at this time. Thank you!

## 2024-02-06 ENCOUNTER — OFFICE VISIT (OUTPATIENT)
Dept: FAMILY MEDICINE CLINIC | Age: 59
End: 2024-02-06
Payer: COMMERCIAL

## 2024-02-06 VITALS
HEIGHT: 74 IN | BODY MASS INDEX: 32.98 KG/M2 | SYSTOLIC BLOOD PRESSURE: 132 MMHG | WEIGHT: 257 LBS | OXYGEN SATURATION: 97 % | HEART RATE: 76 BPM | DIASTOLIC BLOOD PRESSURE: 74 MMHG

## 2024-02-06 DIAGNOSIS — Z13.1 SCREENING FOR DIABETES MELLITUS: ICD-10-CM

## 2024-02-06 DIAGNOSIS — M19.90 ARTHRITIS: ICD-10-CM

## 2024-02-06 DIAGNOSIS — I10 ESSENTIAL HYPERTENSION: ICD-10-CM

## 2024-02-06 DIAGNOSIS — J30.9 ALLERGIC RHINITIS, UNSPECIFIED SEASONALITY, UNSPECIFIED TRIGGER: ICD-10-CM

## 2024-02-06 DIAGNOSIS — Z00.00 ENCOUNTER FOR WELL ADULT EXAM WITHOUT ABNORMAL FINDINGS: Primary | ICD-10-CM

## 2024-02-06 DIAGNOSIS — Z13.220 SCREENING, LIPID: ICD-10-CM

## 2024-02-06 DIAGNOSIS — K21.9 GASTROESOPHAGEAL REFLUX DISEASE WITHOUT ESOPHAGITIS: ICD-10-CM

## 2024-02-06 DIAGNOSIS — Z12.5 SCREENING FOR MALIGNANT NEOPLASM OF PROSTATE: ICD-10-CM

## 2024-02-06 PROCEDURE — 99396 PREV VISIT EST AGE 40-64: CPT | Performed by: FAMILY MEDICINE

## 2024-02-06 PROCEDURE — 3075F SYST BP GE 130 - 139MM HG: CPT | Performed by: FAMILY MEDICINE

## 2024-02-06 PROCEDURE — 3078F DIAST BP <80 MM HG: CPT | Performed by: FAMILY MEDICINE

## 2024-02-06 RX ORDER — FLUTICASONE PROPIONATE 50 MCG
2 SPRAY, SUSPENSION (ML) NASAL DAILY
Qty: 16 G | Refills: 5 | Status: SHIPPED | OUTPATIENT
Start: 2024-02-06

## 2024-02-06 RX ORDER — LISINOPRIL 10 MG/1
10 TABLET ORAL DAILY
Qty: 90 TABLET | Refills: 3 | Status: SHIPPED | OUTPATIENT
Start: 2024-02-06

## 2024-02-06 RX ORDER — OMEPRAZOLE 20 MG/1
20 CAPSULE, DELAYED RELEASE ORAL DAILY
Qty: 90 CAPSULE | Refills: 1 | Status: SHIPPED | OUTPATIENT
Start: 2024-02-06

## 2024-02-06 RX ORDER — MELOXICAM 15 MG/1
15 TABLET ORAL DAILY
Qty: 90 TABLET | Refills: 3 | Status: SHIPPED | OUTPATIENT
Start: 2024-02-06

## 2024-02-06 NOTE — PATIENT INSTRUCTIONS
you make healthy changes in your diet.  An exercise specialist or  can help you develop a safe and effective exercise program.  A counselor or psychiatrist can help you cope with issues such as depression, anxiety, or family problems that can make it hard to focus on weight loss.  Consider joining a support group for people who are trying to lose weight. Your doctor can suggest groups in your area.  Where can you learn more?  Go to https://www.[employer].net/patientEd and enter U357 to learn more about \"Starting a Weight Loss Plan: Care Instructions.\"  Current as of: September 20, 2023               Content Version: 13.9  © 6635-9933 [employer].   Care instructions adapted under license by [employer]. If you have questions about a medical condition or this instruction, always ask your healthcare professional. [employer] disclaims any warranty or liability for your use of this information.

## 2024-02-06 NOTE — PROGRESS NOTES
Well Adult Note  Name: Parker Walker Today’s Date: 2024   MRN: [id number] Sex: Male   Age: 58 y.o. Ethnicity: Non- / Non    : 1965 Race: White (non-)      Parker Walker is here for well adult exam.  History:  Sinus issues.  Has been using afrin, and navage.     Review of Systems   Constitutional:  Negative for fatigue and unexpected weight change.   HENT:  Negative for congestion, rhinorrhea and trouble swallowing.    Eyes:  Negative for pain and visual disturbance.   Respiratory:  Negative for chest tightness and shortness of breath.    Cardiovascular:  Negative for chest pain and palpitations.   Gastrointestinal:  Negative for constipation and diarrhea.   Endocrine: Negative for cold intolerance and heat intolerance.   Genitourinary:  Negative for frequency and urgency.   Musculoskeletal:  Negative for arthralgias and back pain.   Skin:  Negative for color change and rash.   Allergic/Immunologic: Negative for environmental allergies and food allergies.   Neurological:  Negative for dizziness and light-headedness.   Hematological:  Negative for adenopathy. Does not bruise/bleed easily.   Psychiatric/Behavioral:  Negative for dysphoric mood and sleep disturbance.        Allergies   Allergen Reactions    No Known Allergies          Prior to Visit Medications    Medication Sig Taking? Authorizing Provider   lisinopril (PRINIVIL;ZESTRIL) 10 MG tablet Take 1 tablet by mouth daily Yes Esequiel Mackey MD   meloxicam (MOBIC) 15 MG tablet Take 1 tablet by mouth daily Yes Esequiel Mackey MD   omeprazole (PRILOSEC) 20 MG delayed release capsule Take 1 capsule by mouth Daily Yes Esequiel Mackey MD   methocarbamol (ROBAXIN) 750 MG tablet Take 1 tablet by mouth 4 times daily Yes Esequiel Mackey MD   Ginkgo Biloba 40 MG TABS Take 120 mg by mouth daily Yes Maximiliano Cárdenas MD   ascorbic acid (VITAMIN C) 500 MG tablet Take 1 tablet by mouth daily Yes Maximiliano Cárdenas MD   Potassium

## 2024-05-14 ENCOUNTER — PATIENT MESSAGE (OUTPATIENT)
Dept: FAMILY MEDICINE CLINIC | Age: 59
End: 2024-05-14

## 2024-05-14 RX ORDER — TAMSULOSIN HYDROCHLORIDE 0.4 MG/1
0.4 CAPSULE ORAL DAILY
Qty: 30 CAPSULE | Refills: 0 | Status: SHIPPED | OUTPATIENT
Start: 2024-05-14

## 2024-05-14 RX ORDER — ONDANSETRON 4 MG/1
4 TABLET, ORALLY DISINTEGRATING ORAL 3 TIMES DAILY PRN
Qty: 21 TABLET | Refills: 0 | Status: SHIPPED | OUTPATIENT
Start: 2024-05-14

## 2024-05-14 NOTE — TELEPHONE ENCOUNTER
From: Parker Walker  To: Dr. Esequiel Mackey  Sent: 5/14/2024 3:18 AM EDT  Subject: prescription needed    I am experiencing the same pain as before when I had kidney stones and I wanted to see if I could get a refill on Two meds for that. Tamsulosin to help break it up and Ondansetron for nausea?  I last had this issue in Jan. 2016. I have 2 of the Tamsulosin left but they are very old.  I am stuck the rest of this year with a bad insurance plan that covers very little. I am trying to avoid going to the ER due to the poor insurance and limited locations that I can go.

## 2024-07-31 DIAGNOSIS — K21.9 GASTROESOPHAGEAL REFLUX DISEASE WITHOUT ESOPHAGITIS: ICD-10-CM

## 2024-07-31 RX ORDER — OMEPRAZOLE 20 MG/1
20 CAPSULE, DELAYED RELEASE ORAL DAILY
Qty: 30 CAPSULE | Refills: 2 | Status: SHIPPED | OUTPATIENT
Start: 2024-07-31

## 2024-07-31 NOTE — TELEPHONE ENCOUNTER
Last Office Visit  -  2/6/24  Next Office Visit  -  n/a    Last Filled  -    Last UDS -    Contract -

## 2024-10-28 DIAGNOSIS — K21.9 GASTROESOPHAGEAL REFLUX DISEASE WITHOUT ESOPHAGITIS: ICD-10-CM

## 2024-10-28 NOTE — TELEPHONE ENCOUNTER
Last Office Visit  -  2/6/24  Next Office Visit  -  n/a    Last Filled  -  7/31/24  Last UDS -    Contract -

## 2024-12-07 ENCOUNTER — PATIENT MESSAGE (OUTPATIENT)
Dept: FAMILY MEDICINE CLINIC | Age: 59
End: 2024-12-07

## 2024-12-07 DIAGNOSIS — M54.50 CHRONIC LOW BACK PAIN, UNSPECIFIED BACK PAIN LATERALITY, UNSPECIFIED WHETHER SCIATICA PRESENT: ICD-10-CM

## 2024-12-07 DIAGNOSIS — G89.29 CHRONIC LOW BACK PAIN, UNSPECIFIED BACK PAIN LATERALITY, UNSPECIFIED WHETHER SCIATICA PRESENT: ICD-10-CM

## 2024-12-08 RX ORDER — METHOCARBAMOL 750 MG/1
750 TABLET, FILM COATED ORAL 4 TIMES DAILY
Qty: 120 TABLET | Refills: 0 | Status: SHIPPED | OUTPATIENT
Start: 2024-12-08

## 2025-01-16 ENCOUNTER — OFFICE VISIT (OUTPATIENT)
Dept: FAMILY MEDICINE CLINIC | Age: 60
End: 2025-01-16
Payer: COMMERCIAL

## 2025-01-16 VITALS
DIASTOLIC BLOOD PRESSURE: 84 MMHG | SYSTOLIC BLOOD PRESSURE: 126 MMHG | HEART RATE: 67 BPM | BODY MASS INDEX: 33.64 KG/M2 | OXYGEN SATURATION: 97 % | WEIGHT: 262 LBS

## 2025-01-16 DIAGNOSIS — G89.29 CHRONIC BILATERAL LOW BACK PAIN WITHOUT SCIATICA: ICD-10-CM

## 2025-01-16 DIAGNOSIS — E78.00 HIGH CHOLESTEROL: ICD-10-CM

## 2025-01-16 DIAGNOSIS — I10 PRIMARY HYPERTENSION: ICD-10-CM

## 2025-01-16 DIAGNOSIS — R73.03 PREDIABETES: ICD-10-CM

## 2025-01-16 DIAGNOSIS — Z00.00 PHYSICAL EXAM: Primary | ICD-10-CM

## 2025-01-16 DIAGNOSIS — Z12.5 PROSTATE CANCER SCREENING: ICD-10-CM

## 2025-01-16 DIAGNOSIS — M54.50 CHRONIC BILATERAL LOW BACK PAIN WITHOUT SCIATICA: ICD-10-CM

## 2025-01-16 LAB
ALBUMIN SERPL-MCNC: 4.4 G/DL (ref 3.4–5)
ALBUMIN/GLOB SERPL: 1.7 {RATIO} (ref 1.1–2.2)
ALP SERPL-CCNC: 89 U/L (ref 40–129)
ALT SERPL-CCNC: 44 U/L (ref 10–40)
ANION GAP SERPL CALCULATED.3IONS-SCNC: 11 MMOL/L (ref 3–16)
AST SERPL-CCNC: 33 U/L (ref 15–37)
BILIRUB SERPL-MCNC: 0.3 MG/DL (ref 0–1)
BUN SERPL-MCNC: 13 MG/DL (ref 7–20)
CALCIUM SERPL-MCNC: 9.8 MG/DL (ref 8.3–10.6)
CHLORIDE SERPL-SCNC: 104 MMOL/L (ref 99–110)
CHOLEST SERPL-MCNC: 156 MG/DL (ref 0–199)
CO2 SERPL-SCNC: 25 MMOL/L (ref 21–32)
CREAT SERPL-MCNC: 1.1 MG/DL (ref 0.9–1.3)
GFR SERPLBLD CREATININE-BSD FMLA CKD-EPI: 77 ML/MIN/{1.73_M2}
GLUCOSE SERPL-MCNC: 114 MG/DL (ref 70–99)
HDLC SERPL-MCNC: 32 MG/DL (ref 40–60)
LDLC SERPL CALC-MCNC: 99 MG/DL
POTASSIUM SERPL-SCNC: 4.7 MMOL/L (ref 3.5–5.1)
PROT SERPL-MCNC: 7 G/DL (ref 6.4–8.2)
PSA SERPL DL<=0.01 NG/ML-MCNC: 0.44 NG/ML (ref 0–4)
SODIUM SERPL-SCNC: 140 MMOL/L (ref 136–145)
TRIGL SERPL-MCNC: 125 MG/DL (ref 0–150)
VLDLC SERPL CALC-MCNC: 25 MG/DL

## 2025-01-16 PROCEDURE — 3079F DIAST BP 80-89 MM HG: CPT | Performed by: NURSE PRACTITIONER

## 2025-01-16 PROCEDURE — 3074F SYST BP LT 130 MM HG: CPT | Performed by: NURSE PRACTITIONER

## 2025-01-16 PROCEDURE — 99396 PREV VISIT EST AGE 40-64: CPT | Performed by: NURSE PRACTITIONER

## 2025-01-16 RX ORDER — HYDROCODONE BITARTRATE AND ACETAMINOPHEN 5; 325 MG/1; MG/1
1 TABLET ORAL EVERY 8 HOURS PRN
Qty: 20 TABLET | Refills: 0 | Status: SHIPPED | OUTPATIENT
Start: 2025-01-16 | End: 2025-02-15

## 2025-01-16 SDOH — ECONOMIC STABILITY: FOOD INSECURITY: WITHIN THE PAST 12 MONTHS, YOU WORRIED THAT YOUR FOOD WOULD RUN OUT BEFORE YOU GOT MONEY TO BUY MORE.: NEVER TRUE

## 2025-01-16 SDOH — ECONOMIC STABILITY: FOOD INSECURITY: WITHIN THE PAST 12 MONTHS, THE FOOD YOU BOUGHT JUST DIDN'T LAST AND YOU DIDN'T HAVE MONEY TO GET MORE.: NEVER TRUE

## 2025-01-16 ASSESSMENT — ENCOUNTER SYMPTOMS
DIARRHEA: 0
SHORTNESS OF BREATH: 0
SORE THROAT: 0
WHEEZING: 0
CONSTIPATION: 0
ABDOMINAL PAIN: 0
BACK PAIN: 1

## 2025-01-16 ASSESSMENT — PATIENT HEALTH QUESTIONNAIRE - PHQ9
SUM OF ALL RESPONSES TO PHQ QUESTIONS 1-9: 0
SUM OF ALL RESPONSES TO PHQ9 QUESTIONS 1 & 2: 0
SUM OF ALL RESPONSES TO PHQ QUESTIONS 1-9: 0
1. LITTLE INTEREST OR PLEASURE IN DOING THINGS: NOT AT ALL
2. FEELING DOWN, DEPRESSED OR HOPELESS: NOT AT ALL
SUM OF ALL RESPONSES TO PHQ QUESTIONS 1-9: 0
SUM OF ALL RESPONSES TO PHQ QUESTIONS 1-9: 0

## 2025-01-16 NOTE — PROGRESS NOTES
Patient ID: Parker Walker is a 59 y.o. male who presents today for a Physical Exam.      HPI  Here for physical exam   Chronic low back pain: taking mobic and robaxin PRN- takes norco sparingly for when his back really flares.     Past Medical History:   Diagnosis Date    Arthritis     Chronic back pain     GERD (gastroesophageal reflux disease)     HTN (hypertension)     Hypertension     Sleep apnea     mild-no longer uses CPAP       Past Surgical History:   Procedure Laterality Date    BACK SURGERY      x 2    CARPAL TUNNEL RELEASE Right     CERVICAL DISC SURGERY      COLONOSCOPY  1/28/16    COLONOSCOPY N/A 10/23/2020    COLON W/ANES. [facility]    KNEE ARTHROSCOPY Right     KNEE ARTHROSCOPY Right 12/14/2020    RIGHT KNEE VIDEO ARTHROSCOPY, MEDIAL MENISECTOMY performed by Noel Lam MD at [facility]    LASIK Bilateral 2013    OTHER SURGICAL HISTORY       RIGHT KNEE VIDEO ARTHROSCOPY, MEDIAL MENISECTOMY (Right Knee)    TONSILLECTOMY      UMBILICAL HERNIA REPAIR N/A 11/9/2020    UMBILICAL HERNIA REPAIR WITH MESH -SLEEP APNEA- performed by Rosales Gutierrez MD at [facility] OR    UPPER GASTROINTESTINAL ENDOSCOPY  10/23/2020    and colonoscopy    UPPER GASTROINTESTINAL ENDOSCOPY N/A 10/23/2020    EGD W/ANES. (10:30) performed by Karel Sanchez MD at [facility] ENDOSCOPY       Family History   Problem Relation Age of Onset    High Blood Pressure Mother     Diabetes Mother     Kidney Disease Mother     Heart Disease Mother     Parkinsonism Father     Dementia Father     Cancer Father     Other Brother         motocycle accident       Social History     Socioeconomic History    Marital status: Single     Spouse name: None    Number of children: None    Years of education: None    Highest education level: None   Tobacco Use    Smoking status: Former     Current packs/day: 0.00     Average packs/day: 1.5 packs/day for 21.0 years (31.5 ttl pk-yrs)     Types: Cigarettes

## 2025-01-17 DIAGNOSIS — K21.9 GASTROESOPHAGEAL REFLUX DISEASE WITHOUT ESOPHAGITIS: ICD-10-CM

## 2025-01-17 DIAGNOSIS — M19.90 ARTHRITIS: ICD-10-CM

## 2025-01-17 DIAGNOSIS — I10 ESSENTIAL HYPERTENSION: ICD-10-CM

## 2025-01-17 LAB
EST. AVERAGE GLUCOSE BLD GHB EST-MCNC: 119.8 MG/DL
HBA1C MFR BLD: 5.8 %

## 2025-01-17 RX ORDER — MELOXICAM 15 MG/1
15 TABLET ORAL DAILY
Qty: 30 TABLET | Refills: 0 | Status: SHIPPED | OUTPATIENT
Start: 2025-01-17 | End: 2025-02-16

## 2025-01-17 RX ORDER — LISINOPRIL 10 MG/1
10 TABLET ORAL DAILY
Qty: 90 TABLET | Refills: 3 | Status: SHIPPED | OUTPATIENT
Start: 2025-01-17 | End: 2026-01-12

## 2025-01-17 NOTE — TELEPHONE ENCOUNTER
Last Office Visit  -  1/16/25  Next Office Visit  -  n/a    Last Filled  -    Last UDS -    Contract -

## 2025-01-20 DIAGNOSIS — M19.90 ARTHRITIS: ICD-10-CM

## 2025-01-20 RX ORDER — MELOXICAM 15 MG/1
15 TABLET ORAL DAILY
Qty: 90 TABLET | Refills: 0 | Status: SHIPPED | OUTPATIENT
Start: 2025-01-20 | End: 2025-04-20

## 2025-03-04 ENCOUNTER — PATIENT MESSAGE (OUTPATIENT)
Dept: FAMILY MEDICINE CLINIC | Age: 60
End: 2025-03-04

## 2025-03-04 RX ORDER — METHYLPREDNISOLONE 4 MG/1
TABLET ORAL
Qty: 1 KIT | Refills: 0 | Status: SHIPPED | OUTPATIENT
Start: 2025-03-04

## 2025-04-21 DIAGNOSIS — M19.90 ARTHRITIS: ICD-10-CM

## 2025-04-21 RX ORDER — MELOXICAM 15 MG/1
15 TABLET ORAL DAILY
Qty: 90 TABLET | Refills: 3 | Status: SHIPPED | OUTPATIENT
Start: 2025-04-21

## (undated) DEVICE — SURGICAL PROCEDURE PACK IV U-BAR

## (undated) DEVICE — GLOVE SURG SZ 75 L12IN FNGR THK94MIL STD WHT LTX FREE

## (undated) DEVICE — PENCIL ES L3M BTTN SWCH S STL HEX LOK BLDE ELECTRD HOLSTER

## (undated) DEVICE — CHLORAPREP 26ML ORANGE

## (undated) DEVICE — Z INACTIVE USE 2660664 SOLUTION IRRIG 3000ML 0.9% SOD CHL USP UROMATIC PLAS CONT

## (undated) DEVICE — STERILE POLYISOPRENE POWDER-FREE SURGICAL GLOVES: Brand: PROTEXIS

## (undated) DEVICE — YANKAUER,BULB TIP,W/O VENT,RIGID,STERILE: Brand: MEDLINE

## (undated) DEVICE — SUTURE PROL SZ 1 L30IN NONABSORBABLE BLU L36MM CT-1 1/2 CIR 8425H

## (undated) DEVICE — GLOVE ORANGE PI 8 1/2   MSG9085

## (undated) DEVICE — ELECTRODE PT RET AD L9FT HI MOIST COND ADH HYDRGEL CORDED

## (undated) DEVICE — SET ADMIN PRIMING 7ML L30IN 7.35LB 20 GTT 2ND RLER CLMP

## (undated) DEVICE — GOWN,AURORA,NONREINF,RAGLAN,XXL,STERILE: Brand: MEDLINE

## (undated) DEVICE — Device

## (undated) DEVICE — CONMED SCOPE SAVER BITE BLOCK, 20X27 MM: Brand: SCOPE SAVER

## (undated) DEVICE — MINOR SET UP PK

## (undated) DEVICE — BLADE SHV L13CM DIA4MM EXCALIBUR AGG COOLCUT

## (undated) DEVICE — SOLUTION IV 1000ML LAC RINGERS PH 6.5 INJ USP VIAFLX PLAS

## (undated) DEVICE — TUBING, SUCTION, 3/16" X 10', STRAIGHT: Brand: MEDLINE

## (undated) DEVICE — 3M™ STERI-STRIP™ REINFORCED ADHESIVE SKIN CLOSURES, R1546, 1/4 IN X 4 IN (6 MM X 100 MM), 10 STRIPS/ENVELOPE: Brand: 3M™ STERI-STRIP™

## (undated) DEVICE — 3M™ STERI-STRIP™ REINFORCED ADHESIVE SKIN CLOSURES, R1547, 1/2 IN X 4 IN (12 MM X 100 MM), 6 STRIPS/ENVELOPE: Brand: 3M™ STERI-STRIP™

## (undated) DEVICE — GAUZE,SPONGE,4"X4",16PLY,STRL,LF,10/TRAY: Brand: MEDLINE

## (undated) DEVICE — SUTURE VCRL SZ 0 L27IN ABSRB UD L26MM CT-2 1/2 CIR J270H

## (undated) DEVICE — GLOVE,SURG,SENSICARE,ALOE,LF,PF,7: Brand: MEDLINE

## (undated) DEVICE — 3M™ TEGADERM™ TRANSPARENT FILM DRESSING FRAME STYLE, 1624W, 2-3/8 IN X 2-3/4 IN (6 CM X 7 CM), 100/CT 4CT/CASE: Brand: 3M™ TEGADERM™

## (undated) DEVICE — 3M™ TEGADERM™ TRANSPARENT FILM DRESSING FRAME STYLE, 1626W, 4 IN X 4-3/4 IN (10 CM X 12 CM), 50/CT 4CT/CASE: Brand: 3M™ TEGADERM™

## (undated) DEVICE — SUTURE NONABSORBABLE MONOFILAMENT 4-0 FS-2 18 IN ETHILON 662H

## (undated) DEVICE — SUTURE VCRL SZ 4-0 L18IN ABSRB UD L19MM PS-2 3/8 CIR PRIM J496H

## (undated) DEVICE — ENDO CARRY-ON PROCEDURE KIT INCLUDES SUCTION TUBING, LUBRICANT, GAUZE, BIOHAZARD STICKER, TRANSPORT PAD AND INTERCEPT BEDSIDE KIT.: Brand: ENDO CARRY-ON PROCEDURE KIT

## (undated) DEVICE — MANIFOLD SURG NEPTUNE WST MGMT

## (undated) DEVICE — APPLICATOR PREP 26ML 0.7% IOD POVACRYLEX 74% ISO ALC ST

## (undated) DEVICE — CATHETER IV 20GA L1.25IN PNK FEP SFTY STR HUB RADPQ DISP

## (undated) DEVICE — SET GRAV VENT NVENT CK VLV 3 NDL FREE PRT 10 GTT

## (undated) DEVICE — MEDI-VAC NON-CONDUCTIVE SUCTION TUBING: Brand: CARDINAL HEALTH

## (undated) DEVICE — ELECTRODE ECG MONITR FOAM TEAR DROP ADLT RED

## (undated) DEVICE — GOWN,SIRUS,NON REINFRCD,LARGE,SET IN SL: Brand: MEDLINE

## (undated) DEVICE — GOWN,SIRUS,POLYRNF,SETINSLV,L,20/CS: Brand: MEDLINE

## (undated) DEVICE — TUBING PMP L8FT LNG W/ CONN FOR AR-6400 REDEUCE

## (undated) DEVICE — PADDING CAST W6INXL4YD COT LO LINTING WYTEX

## (undated) DEVICE — SUTURE VCRL SZ 3-0 L18IN ABSRB UD L26MM SH 1/2 CIR J864D

## (undated) DEVICE — TUBE IRRIG L8IN LNG PT W/ CONN FOR PMP SYS REDEUCE